# Patient Record
Sex: MALE | Race: WHITE | HISPANIC OR LATINO | Employment: FULL TIME | URBAN - METROPOLITAN AREA
[De-identification: names, ages, dates, MRNs, and addresses within clinical notes are randomized per-mention and may not be internally consistent; named-entity substitution may affect disease eponyms.]

---

## 2017-08-03 ENCOUNTER — ALLSCRIPTS OFFICE VISIT (OUTPATIENT)
Dept: OTHER | Facility: OTHER | Age: 37
End: 2017-08-03

## 2017-08-03 ENCOUNTER — HOSPITAL ENCOUNTER (OUTPATIENT)
Dept: RADIOLOGY | Facility: HOSPITAL | Age: 37
Discharge: HOME | End: 2017-08-03
Payer: COMMERCIAL

## 2017-08-03 DIAGNOSIS — R07.81 PLEURODYNIA: ICD-10-CM

## 2017-08-03 PROCEDURE — 71101 X-RAY EXAM UNILAT RIBS/CHEST: CPT

## 2018-01-09 NOTE — MISCELLANEOUS
Message  Return to work or school:    He is able to return to work on  6/6/2016      Patient was seen on our office on 6/3/2016  Dr Rafa De Jesus DO / Fidel Standard        Signatures   Electronically signed by : Jenaro Valle RN; Cristian  3 2016 12:14PM EST                       (Author)

## 2018-01-13 VITALS
WEIGHT: 222 LBS | DIASTOLIC BLOOD PRESSURE: 70 MMHG | BODY MASS INDEX: 34.84 KG/M2 | SYSTOLIC BLOOD PRESSURE: 142 MMHG | HEIGHT: 67 IN

## 2018-01-14 NOTE — RESULT NOTES
Message   HIV test wnl  Verified Results  (1) HIV-1 RNA QUANTITATIVE [id number] 02:47PM Esteban Leung Order Number: [id number]     Test Name Result Flag Reference   HIV-1 RNA QUANT      <20  The reportable range for this assay is 20 to 10,000,000  copies HIV-1 RNA/mL   copies/mL   HIV-1 RNA VIRAL LOAD LOG      COMMNT  component test  [id number]/mL   Performed at:  [address] [place] [address]  [id number]  : Dino Anton MD, Phone:  [phone]

## 2018-01-15 NOTE — RESULT NOTES
Message   cxray wnl  Verified Results  * XR CHEST PA & LATERAL [id number] 12:28PM Luli Pride Order Number: [id number]     Test Name Result Flag Reference   XR CHEST PA & LATERAL (Report)     CHEST     INDICATION: Cough, chest tightness   COMPARISON: None     VIEWS: Frontal and lateral projections; 3 images     FINDINGS:     The lungs are clear  No pleural effusions  The cardiomediastinal silhouette is unremarkable  Bony thorax unremarkable         IMPRESSION:     No acute cardiopulmonary disease       Workstation performed: [id number]     Signed by:   Ramon Bradley MD   6/3/16

## 2018-01-18 NOTE — RESULT NOTES
Message   HIV test wnl  Verified Results  (1) HIV-1 RNA QUANTITATIVE [id number] 02:47PM Abelardo Cowan Order Number: [id number]     Test Name Result Flag Reference   HIV-1 RNA QUANT      HIV-1 RNA not detected  The reportable range for this assay is 20 to 10,000,000  copies HIV-1 RNA/mL   copies/mL   HIV-1 RNA VIRAL LOAD LOG      Unable to calculate result since non-numeric result obtained for  component test  [id number]/mL   Performed at:  [address] [place] [address]  [id number]  : Tammi Bolaños MD, Phone:  [phone]

## 2018-07-02 ENCOUNTER — OFFICE VISIT (OUTPATIENT)
Dept: FAMILY MEDICINE CLINIC | Facility: CLINIC | Age: 38
End: 2018-07-02
Payer: COMMERCIAL

## 2018-07-02 VITALS
HEIGHT: 67 IN | WEIGHT: 204 LBS | DIASTOLIC BLOOD PRESSURE: 88 MMHG | SYSTOLIC BLOOD PRESSURE: 132 MMHG | BODY MASS INDEX: 32.02 KG/M2

## 2018-07-02 DIAGNOSIS — B02.9 HERPES ZOSTER WITHOUT COMPLICATION: Primary | ICD-10-CM

## 2018-07-02 PROCEDURE — 3008F BODY MASS INDEX DOCD: CPT | Performed by: NURSE PRACTITIONER

## 2018-07-02 PROCEDURE — 99213 OFFICE O/P EST LOW 20 MIN: CPT | Performed by: NURSE PRACTITIONER

## 2018-07-02 RX ORDER — VALACYCLOVIR HYDROCHLORIDE 1 G/1
1000 TABLET, FILM COATED ORAL 3 TIMES DAILY
Qty: 21 TABLET | Refills: 0 | Status: SHIPPED | OUTPATIENT
Start: 2018-07-02 | End: 2020-07-02 | Stop reason: CLARIF

## 2018-07-02 NOTE — PATIENT INSTRUCTIONS
Start antiviral, this is 1000mg every 8 hours (3 times a day) for 7 days  Stay away from any babies or pregnant women  Call if any worsening symptoms or no improvement  You may use tylenol/advil for pain  If pain increases let us know  Shingles   AMBULATORY CARE:   Shingles  is a painful rash  Shingles is caused by the same virus that causes chickenpox (varicella-zoster virus)  After you get chickenpox, the virus stays in your body for several years without causing any symptoms  Shingles occurs when the virus becomes active again  Once active, the virus will travel along a nerve to your skin and cause a rash  Common signs and symptoms include the following:  Shingles often starts with pain in the back, chest, neck, or face  A rash then develops in the same area  The rash is usually found on only one side of the body  The rash may feel itchy or painful  It starts as red dots that become blisters filled with fluid  The blisters usually grow bigger, become filled with pus, and then crust over after a few days  You may also have any of the following:  · Fatigue and muscle weakness    · Pain when your skin is lightly touched    · Headache    · Fever    · Eye pain when exposed to light  Seek care immediately if:   · You have painful, red, warm skin around the blisters, or the blisters drain pus  · Your neck is stiff or you have trouble moving it  · You have trouble moving your arms, legs, or face  · You have a seizure  · You have weakness in an arm or leg  · You become confused, or have difficulty speaking  · You have dizziness, a severe headache, or hearing or vision loss  Contact your healthcare provider if:   · You feel weak or have a headache  · You have a cough, chills, or a fever  · You have abdominal pain or nausea, or you are vomiting  · Your rash becomes more itchy or painful  · Your rash spreads to other parts of your body      · Your pain worsens and does not go away even after you take medicine  · You have questions or concerns about your condition or care  Medicines:   · Antiviral medicine  helps decrease symptoms and healing time  They may also decrease your risk of developing nerve pain  You will need to start taking them within 3 days of the start of symptoms to prevent nerve pain  · Pain medicine  may be prescribed or suggested by your healthcare provider  You may need NSAIDs, acetaminophen, or opioid medicine depending on how much pain you are in  Do not wait until the pain is severe before you take more pain medicine  · Topical anesthetics  are used to numb the skin and decrease pain  They can be a cream, gel, spray, or patch  · Anticonvulsants  decrease nerve pain and may help you sleep at night  · Antidepressants  may be used to decrease nerve pain  Follow up with your healthcare provider as directed:  Write down your questions so you remember to ask them during your visits  Self-care:  Keep your rash clean and dry  Cover your rash with a bandage or clothing  Do not use bandages that stick to your skin  The sticky part may irritate your skin and make your rash last longer  Prevent the spread of shingles: The virus can be passed to a person who has never had chickenpox  This person may get chickenpox, but not shingles  You may pass the virus to others as long as you have a rash  The virus is spread by direct contact with the fluid from the blisters  Usually, you cannot spread the virus once the blisters dry up  Prevent shingles or another shingles outbreak:  A vaccine may be given to help prevent shingles  Ask for more information about this vaccine  © 2017 [employer] Information is for End User's use only and may not be sold, redistributed or otherwise used for commercial purposes  All illustrations and images included in CareNotes® are the copyrighted property of A D A M [employer] Inc  or [employer]    The above information is an  only  It is not intended as medical advice for individual conditions or treatments  Talk to your doctor, nurse or pharmacist before following any medical regimen to see if it is safe and effective for you

## 2018-07-02 NOTE — PROGRESS NOTES
Assessment/Plan:    Herpes zoster without complication  Will start valtrex 1000mg TID for 7 days  Use tylenol or advil PRN for pain  Patient educated to stay way from pregnant women in babies  Handout on shingles given the patient  If pain is not well tolerated with Advil or Tylenol he is to notify us and we can try a Lidoderm cream   Patient is to call us if any worsening symptoms or no improvement  Patient verbalizes understand and agrees with treatment plan  Diagnoses and all orders for this visit:    Herpes zoster without complication  -     valACYclovir (VALTREX) 1,000 mg tablet; Take 1 tablet (1,000 mg total) by mouth 3 (three) times a day for 7 days          Subjective:      Patient ID: Karen Fischer is a 44 y o  male  Chief Complaint   Patient presents with    Rash     rash possible shingles on back on the left side stated its not painful but just uncomfortable       Patient presents to office today for evaluation of a rash that is on his left back  Patient states that appeared about 1-2 weeks ago  He states that part of it was improving but it is spreading around his back to his rib  Patient states that is a little bit tender but there is really not a whole lot of pain there  Patient states he did have chickenpox as a child  Rash   This is a new problem  The current episode started 1 to 4 weeks ago  The problem is unchanged  Location: left back and rib  The rash is characterized by blistering, redness and pain  He was exposed to nothing  Pertinent negatives include no anorexia, congestion, cough, diarrhea, eye pain, facial edema, fatigue, fever, joint pain, rhinorrhea, shortness of breath, sore throat or vomiting  Past treatments include nothing  The treatment provided no relief         The following portions of the patient's history were reviewed and updated as appropriate: allergies, current medications, past family history, past social history and problem list     Review of Systems Constitutional: Negative for fatigue and fever  HENT: Negative for congestion, rhinorrhea and sore throat  Eyes: Negative for pain  Respiratory: Negative for cough and shortness of breath  Gastrointestinal: Negative for abdominal pain, anorexia, diarrhea, nausea and vomiting  Genitourinary: Negative for difficulty urinating and dysuria  Musculoskeletal: Negative for joint pain and myalgias  Skin: Positive for rash  Neurological: Negative for dizziness, light-headedness and headaches  Psychiatric/Behavioral: Negative for agitation  Objective:  /88 (BP Location: Left arm, Patient Position: Sitting, Cuff Size: Large)   Ht 5' 7" (1 702 m)   Wt 92 5 kg (204 lb)   BMI 31 95 kg/m²      Physical Exam   Constitutional: He is oriented to person, place, and time  He appears well-developed  No distress  obese   HENT:   Head: Normocephalic and atraumatic  Right Ear: External ear normal    Left Ear: External ear normal    Nose: Nose normal    Eyes: Conjunctivae and lids are normal  Right eye exhibits no discharge  Left eye exhibits no discharge  Neck: Normal range of motion  Neck supple  No tracheal deviation present  Cardiovascular: Normal rate and regular rhythm  No murmur heard  Pulmonary/Chest: Effort normal and breath sounds normal  No respiratory distress  He has no wheezes  Abdominal: Soft  Bowel sounds are normal  He exhibits no distension  There is no tenderness  There is no guarding  Musculoskeletal: Normal range of motion  He exhibits no edema, tenderness or deformity  Lymphadenopathy:     He has no cervical adenopathy  Neurological: He is alert and oriented to person, place, and time  Coordination normal    Skin: Skin is warm and dry  Rash noted  Rash is vesicular  He is not diaphoretic  No erythema  Vesicular rash consistent with herpes zoster present on left back spreading around left ribs    Psychiatric: He has a normal mood and affect   His speech is normal and behavior is normal  Judgment and thought content normal  Cognition and memory are normal    Nursing note and vitals reviewed

## 2018-07-02 NOTE — ASSESSMENT & PLAN NOTE
Will start valtrex 1000mg TID for 7 days  Use tylenol or advil PRN for pain  Patient educated to stay way from pregnant women in babies  Handout on shingles given the patient  If pain is not well tolerated with Advil or Tylenol he is to notify us and we can try a Lidoderm cream   Patient is to call us if any worsening symptoms or no improvement

## 2019-11-26 DIAGNOSIS — Z80.6 FAMILY HISTORY OF AML (ACUTE MYELOID LEUKEMIA): Primary | ICD-10-CM

## 2020-05-15 ENCOUNTER — TELEMEDICINE (OUTPATIENT)
Dept: FAMILY MEDICINE CLINIC | Facility: CLINIC | Age: 40
End: 2020-05-15
Payer: COMMERCIAL

## 2020-05-15 DIAGNOSIS — M54.2 NECK PAIN: Primary | ICD-10-CM

## 2020-05-15 DIAGNOSIS — M99.01 SOMATIC DYSFUNCTION OF CERVICAL REGION: ICD-10-CM

## 2020-05-15 PROCEDURE — 99213 OFFICE O/P EST LOW 20 MIN: CPT | Performed by: FAMILY MEDICINE

## 2020-05-15 RX ORDER — CYCLOBENZAPRINE HCL 10 MG
10 TABLET ORAL
Qty: 20 TABLET | Refills: 0 | Status: SHIPPED | OUTPATIENT
Start: 2020-05-15 | End: 2020-07-02 | Stop reason: CLARIF

## 2020-06-11 ENCOUNTER — OFFICE VISIT (OUTPATIENT)
Dept: FAMILY MEDICINE CLINIC | Facility: CLINIC | Age: 40
End: 2020-06-11
Payer: COMMERCIAL

## 2020-06-11 VITALS
HEART RATE: 98 BPM | DIASTOLIC BLOOD PRESSURE: 84 MMHG | TEMPERATURE: 97.5 F | OXYGEN SATURATION: 98 % | WEIGHT: 212.4 LBS | SYSTOLIC BLOOD PRESSURE: 128 MMHG | BODY MASS INDEX: 33.34 KG/M2 | HEIGHT: 67 IN

## 2020-06-11 DIAGNOSIS — R68.89 WORSENING FUNCTIONAL ENDURANCE: ICD-10-CM

## 2020-06-11 DIAGNOSIS — F43.9 STRESS: ICD-10-CM

## 2020-06-11 DIAGNOSIS — R42 DIZZINESS: Primary | ICD-10-CM

## 2020-06-11 DIAGNOSIS — R03.0 ELEVATED BLOOD-PRESSURE READING WITHOUT DIAGNOSIS OF HYPERTENSION: ICD-10-CM

## 2020-06-11 PROCEDURE — 3008F BODY MASS INDEX DOCD: CPT | Performed by: FAMILY MEDICINE

## 2020-06-11 PROCEDURE — 99214 OFFICE O/P EST MOD 30 MIN: CPT | Performed by: FAMILY MEDICINE

## 2020-06-11 RX ORDER — ALPRAZOLAM 0.25 MG/1
TABLET ORAL
Qty: 10 TABLET | Refills: 0 | Status: SHIPPED | OUTPATIENT
Start: 2020-06-11

## 2020-07-02 ENCOUNTER — OFFICE VISIT (OUTPATIENT)
Dept: FAMILY MEDICINE CLINIC | Facility: CLINIC | Age: 40
End: 2020-07-02
Payer: COMMERCIAL

## 2020-07-02 VITALS
DIASTOLIC BLOOD PRESSURE: 90 MMHG | OXYGEN SATURATION: 97 % | HEIGHT: 68 IN | BODY MASS INDEX: 32.16 KG/M2 | WEIGHT: 212.2 LBS | SYSTOLIC BLOOD PRESSURE: 130 MMHG | TEMPERATURE: 97.5 F | HEART RATE: 76 BPM

## 2020-07-02 DIAGNOSIS — F41.9 ANXIETY: Primary | ICD-10-CM

## 2020-07-02 PROCEDURE — 3008F BODY MASS INDEX DOCD: CPT | Performed by: NURSE PRACTITIONER

## 2020-07-02 PROCEDURE — 99213 OFFICE O/P EST LOW 20 MIN: CPT | Performed by: FAMILY MEDICINE

## 2020-07-02 PROCEDURE — 1036F TOBACCO NON-USER: CPT | Performed by: FAMILY MEDICINE

## 2020-07-05 NOTE — PROGRESS NOTES
Assessment/Plan:    Labs reviewed with patient  All are normal   Try to maintain healthy diet and watch weight as he is obese  Symptoms he was having seemed quite resolved will by taking Xanax  He would like to stay with this regimen for now and only use it as a p r n  If needs refill will have him sign controlled substance agreement  Otherwise consider flu shot in the fall  Diagnoses and all orders for this visit:    Anxiety        1  Anxiety         Subjective:        Patient ID: Yvette Fontanez is a 35 y o  male  Chief Complaint   Patient presents with    Follow-up     3 weeks; Patient here for review of labs  States Xanax helped his symptoms discussed on previous visit  Has only used once or twice  The following portions of the patient's history were reviewed and updated as appropriate: past medical history, past surgical history and problem list       Review of Systems   Constitutional: Negative for fatigue  Eyes: Negative for visual disturbance  Respiratory: Negative for cough and shortness of breath  Cardiovascular: Negative for chest pain, palpitations and leg swelling  Gastrointestinal: Negative for abdominal pain  Neurological: Negative for dizziness and headaches  Psychiatric/Behavioral: The patient is nervous/anxious  Objective:  /90 (BP Location: Left arm, Patient Position: Sitting, Cuff Size: Standard)   Pulse 76   Temp 97 5 °F (36 4 °C)   Ht 5' 7 5" (1 715 m)   Wt 96 3 kg (212 lb 3 2 oz)   SpO2 97%   BMI 32 74 kg/m²        Physical Exam   Constitutional: He is oriented to person, place, and time  He appears well-developed and well-nourished  No distress  Obese   HENT:   Head: Normocephalic  Right Ear: Tympanic membrane normal    Left Ear: Tympanic membrane normal    Nose: Nose normal    Eyes: Pupils are equal, round, and reactive to light  No scleral icterus  Cardiovascular: Normal heart sounds  No murmur heard    Pulmonary/Chest: Breath sounds normal    Musculoskeletal: He exhibits no edema  Lymphadenopathy:     He has no cervical adenopathy  Neurological: He is alert and oriented to person, place, and time  Psychiatric: He has a normal mood and affect  His behavior is normal  Thought content normal    Nursing note and vitals reviewed

## 2020-07-17 ENCOUNTER — NURSE TRIAGE (OUTPATIENT)
Dept: OTHER | Facility: OTHER | Age: 40
End: 2020-07-17

## 2020-07-18 NOTE — TELEPHONE ENCOUNTER
Pts wife reported that pt thinks he is having a sinus infection beginning, but also unsure if he may have covid  Fever x 2 days, at it's highest 102 1 (oral) today  Temp now 98 5 (oral)  Attempted multiple times to schedule a virtual visit with pts PCP but was unable to do so  Pt's wife was advised to call back or go to [facility] over the weekend if his symptoms are worsening  Pts wife was also agreeable to contacting PCP office on Monday morning to schedule an appt if he is not seen over the weekend for worsening symptoms

## 2020-07-18 NOTE — TELEPHONE ENCOUNTER
Regarding: Coronavirus   ----- Message from Debbie Olivera sent at 7/17/2020  8:58 PM EDT -----  Wife calling back, still awaiting a call, gave Motrin and fever still holding at 102  ----- Message from Hortencia Schmid sent at 7/17/2020  7:01 PM EDT -----  Patient's wife called stating the patient is having a fever of 102 6, body aches, and headaches   Please call patient back

## 2020-07-18 NOTE — TELEPHONE ENCOUNTER
Reason for Disposition   COVID-19, questions about    Answer Assessment - Initial Assessment Questions  1  COVID-19 DIAGNOSIS: "Who made your Coronavirus (COVID-19) diagnosis?" "Was it confirmed by a positive lab test?" If not diagnosed by a HCP, ask "Are there lots of cases (community spread) where you live?" (See public health department website, if unsure)    * MAJOR community spread: high number of cases; numbers of cases are increasing; many people hospitalized  * MINOR community spread: low number of cases; not increasing; few or no people hospitalized      Not diagnosed, not around anyone who was covid positive, moderate community spread  2  ONSET: "When did the COVID-19 symptoms start?"       yesterday  3  WORST SYMPTOM: "What is your worst symptom?" (e g , cough, fever, shortness of breath, muscle aches)      Body aches  4  COUGH: "Do you have a cough?" If so, ask: "How bad is the cough?"        denies  5  FEVER: "Do you have a fever?" If so, ask: "What is your temperature, how was it measured, and when did it start?"      Started yesterday, highest of 102 1 today (taken orally)  6  RESPIRATORY STATUS: "Describe your breathing?" (e g , shortness of breath, wheezing, unable to speak)       normal  7  BETTER-SAME-WORSE: "Are you getting better, staying the same or getting worse compared to yesterday?"  If getting worse, ask, "In what way?"      Slightly worse today  8  HIGH RISK DISEASE: "Do you have any chronic medical problems?" (e g , asthma, heart or lung disease, weak immune system, etc )      denies  9   OTHER SYMPTOMS: "Do you have any other symptoms?"  (e g , runny nose, headache, sore throat, loss of smell)       Slight congestion and sinus pressure, had headache earlier    Protocols used: CORONAVIRUS (COVID-19) - DIAGNOSED OR SUSPECTED-ADULT-

## 2020-07-20 ENCOUNTER — TELEMEDICINE (OUTPATIENT)
Dept: FAMILY MEDICINE CLINIC | Facility: CLINIC | Age: 40
End: 2020-07-20
Payer: COMMERCIAL

## 2020-07-20 VITALS — TEMPERATURE: 97.7 F

## 2020-07-20 DIAGNOSIS — Z20.828 EXPOSURE TO SARS-ASSOCIATED CORONAVIRUS: ICD-10-CM

## 2020-07-20 DIAGNOSIS — Z20.828 EXPOSURE TO SARS-ASSOCIATED CORONAVIRUS: Primary | ICD-10-CM

## 2020-07-20 PROCEDURE — 99214 OFFICE O/P EST MOD 30 MIN: CPT | Performed by: NURSE PRACTITIONER

## 2020-07-20 PROCEDURE — U0003 INFECTIOUS AGENT DETECTION BY NUCLEIC ACID (DNA OR RNA); SEVERE ACUTE RESPIRATORY SYNDROME CORONAVIRUS 2 (SARS-COV-2) (CORONAVIRUS DISEASE [COVID-19]), AMPLIFIED PROBE TECHNIQUE, MAKING USE OF HIGH THROUGHPUT TECHNOLOGIES AS DESCRIBED BY CMS-2020-01-R: HCPCS

## 2020-07-20 NOTE — PROGRESS NOTES
COVID-19 Virtual Visit     Assessment/Plan:    Problem List Items Addressed This Visit     None      Visit Diagnoses     Exposure to SARS-associated coronavirus    -  Primary    Relevant Orders    MISC COVID-19 TEST- Collected at [facility] or [facility]        This virtual check-in was done via [employer] and patient was informed that this is a secure, HIPAA-compliant platform  He agrees to proceed     Disposition:      I referred Jaquan Chavez to one of our centralized sites for a COVID-19 swab  Patient advised to remain in isolation until results back  Fluids/ hydration/ tylenol/ advil/ heat for neck pain, rest  Advised to call for any changes in symptoms, any worsening such as any respiratory symptoms changes in neck tension or fever not going down with medication  Please call the office if you are experiencing any worsening of symptoms or no symptom improvement  I spent 15 minutes with patient today in which greater than 50% of the time was spent in counseling/coordination of care regarding covid screening    Encounter provider Darlene Delgado, [facility]    Provider located at [address] [place] [phone]    Recent Visits  No visits were found meeting these conditions  Showing recent visits within past 7 days and meeting all other requirements     Today's Visits  Date Type Provider Dept   07/20/20 Telemedicine KIMBERLY Flood [facility] today's visits and meeting all other requirements     Future Appointments  No visits were found meeting these conditions  Showing future appointments within next 150 days and meeting all other requirements        Patient agrees to participate in a virtual check in via telephone or video visit instead of presenting to the office to address urgent/immediate medical needs  Patient is aware this is a billable service         After connecting through [employer], the patient was identified by name and date of birth  Stevo Hong was informed that this was a telemedicine visit and that the exam was being conducted confidentially over secure lines  My office door was closed  No one else was in the room  Stevo Hong acknowledged consent and understanding of privacy and security of the telemedicine visit  I informed the patient that I have reviewed his record in Epic and presented the opportunity for him to ask any questions regarding the visit today  The patient agreed to participate  Subjective  Stevo Hong is a 38 y o  male who is concerned about COVID-19  He reports fever, chills, headache, fatigue and myalgias  He has not experienced cough, shortness of breath, sore throat, anorexia, anosmia, abdominal pain, diarrhea, nausea and vomiting He has not had contact with a person who is under investigation for or who is positive for COVID-19 within the last 14 days  He has not been hospitalized recently for fever and/or lower respiratory symptoms  Usually gets post nasal drip, thursday got worse with a fever  Friday the fever got higher at 102  He has pressure in his neck (left side) and eyes as well  Throughout the day he feels better but towards the evening the fever starts to spike again, it does break with Tylenol  He feels his nose is very try too  Had fever last night  Has had headaches as well  Temperature last night was 100 0  Temperature oral this morning 0900 97 7  No past medical history on file  No past surgical history on file  Current Outpatient Medications   Medication Sig Dispense Refill    ALPRAZolam (XANAX) 0 25 mg tablet Take half to 1 tablet daily as needed for severe stress 10 tablet 0     No current facility-administered medications for this visit  Allergies   Allergen Reactions    Amoxicillin     Penicillins        Review of Systems   Constitutional: Positive for chills, fatigue and fever  HENT: Negative for sore throat      Eyes: Negative for discharge  Respiratory: Negative for cough, chest tightness and shortness of breath  Cardiovascular: Negative for chest pain  Gastrointestinal: Negative for constipation and diarrhea  Genitourinary: Negative for difficulty urinating  Musculoskeletal: Positive for neck pain (tightness/ pulled muscle left side)  Negative for joint swelling  Skin: Negative for rash  Neurological: Positive for headaches  Hematological: Negative for adenopathy  Psychiatric/Behavioral: The patient is not nervous/anxious  Video Exam    Vitals:    07/20/20 0927   Temp: 97 7 °F (36 5 °C)   TempSrc: Oral         Maciel appears healthy, alert, no distress  Physical Exam   Constitutional: He is oriented to person, place, and time  He appears well-developed  No distress  obese   HENT:   Head: Normocephalic and atraumatic  Eyes: No scleral icterus  Neck: Normal range of motion  Pulmonary/Chest: Effort normal  No respiratory distress  Neurological: He is alert and oriented to person, place, and time  Skin: He is not diaphoretic  No pallor  VIRTUAL VISIT DISCLAIMER    Leela Dixon acknowledges that he has consented to an online visit or consultation  He understands that the online visit is based solely on information provided by him, and that, in the absence of a face-to-face physical evaluation by the physician, the diagnosis he receives is both limited and provisional in terms of accuracy and completeness  This is not intended to replace a full medical face-to-face evaluation by the physician  Leela Dixon understands and accepts these terms

## 2020-07-23 ENCOUNTER — TELEMEDICINE (OUTPATIENT)
Dept: FAMILY MEDICINE CLINIC | Facility: CLINIC | Age: 40
End: 2020-07-23
Payer: COMMERCIAL

## 2020-07-23 DIAGNOSIS — J01.90 ACUTE NON-RECURRENT SINUSITIS, UNSPECIFIED LOCATION: Primary | ICD-10-CM

## 2020-07-23 LAB — SARS-COV-2 RNA SPEC QL NAA+PROBE: NOT DETECTED

## 2020-07-23 PROCEDURE — 99213 OFFICE O/P EST LOW 20 MIN: CPT | Performed by: NURSE PRACTITIONER

## 2020-07-23 PROCEDURE — 1036F TOBACCO NON-USER: CPT | Performed by: NURSE PRACTITIONER

## 2020-07-23 RX ORDER — PREDNISONE 20 MG/1
40 TABLET ORAL DAILY
Qty: 6 TABLET | Refills: 0 | Status: SHIPPED | OUTPATIENT
Start: 2020-07-23 | End: 2020-07-26

## 2020-07-23 RX ORDER — FLUTICASONE PROPIONATE 50 MCG
1 SPRAY, SUSPENSION (ML) NASAL DAILY
Qty: 1 BOTTLE | Refills: 0 | Status: SHIPPED | OUTPATIENT
Start: 2020-07-23

## 2020-07-23 RX ORDER — DOXYCYCLINE 100 MG/1
100 TABLET ORAL 2 TIMES DAILY
Qty: 14 TABLET | Refills: 0 | Status: SHIPPED | OUTPATIENT
Start: 2020-07-23 | End: 2020-07-30

## 2020-07-23 NOTE — PROGRESS NOTES
Virtual Regular Visit      Assessment/Plan:    Problem List Items Addressed This Visit     None      Visit Diagnoses     Acute non-recurrent sinusitis, unspecified location    -  Primary    Relevant Medications    doxycycline (ADOXA) 100 MG tablet    predniSONE 20 mg tablet    fluticasone (FLONASE) 50 mcg/act nasal spray        Sinusitis  Covid negative  Symptoms consistent with sinus infection  Start antibiotic, this is doxycycline 100 mg twice daily for 7 days  Take antibiotic with food  If you take any vitamin supplements, try to separate dose by 4-6 hours as this may impair absorption of the antibiotic  Start prednisone, this is the steroid  40mg daily for 3 days  Take with food  It's better to take it earlier in the day than later as it could keep you up at night  Do not mix with NSAIDs such as aleve, ibuprofen, advil, motrin  Start Flonase, this is an intranasal corticosteroid  This is 1-2 sprays each nostril once daily  Please be aware that this can cause nasal mucosa irritation  Stop using if you experience any nose bleeds  This can be used for allergy symptoms as well as ear discomfort/pressure  Will call Monday with update and will then decide on plan for return to work  Please call the office if you are experiencing any worsening of symptoms or no symptom improvement              Reason for visit is   Chief Complaint   Patient presents with    Virtual Regular Visit        [facility]    Provider located at [address] [place] [id number]      Recent Visits  Date Type Provider Dept   07/20/20 [facility] 20, [facility] Total [facility] recent visits within past 7 days and meeting all other requirements     Today's Visits  Date Type Provider Dept   07/23/20 Telemedicine KIMBERLY Otto  Total [facility] today's visits and meeting all other requirements Future Appointments  No visits were found meeting these conditions  Showing future appointments within next 150 days and meeting all other requirements        The patient was identified by name and date of birth  Enma Madden was informed that this is a telemedicine visit and that the visit is being conducted through [facility] and patient was informed that this is a secure, HIPAA-compliant platform  He agrees to proceed     My office door was closed  No one else was in the room  He acknowledged consent and understanding of privacy and security of the video platform  The patient has agreed to participate and understands they can discontinue the visit at any time  Patient is aware this is a billable service  Subjective  Enma Madden is a 38 y o  male   Visit today for continued symptoms  Has been having sinus pressure/pain and sinus headaches, also pressure in his ears  Post nasal drip as well  Last time he had a fever was yesterday at 5:30pm 100 6  No fever today  COVID testing negative  No past medical history on file  No past surgical history on file  Current Outpatient Medications   Medication Sig Dispense Refill    ALPRAZolam (XANAX) 0 25 mg tablet Take half to 1 tablet daily as needed for severe stress 10 tablet 0    doxycycline (ADOXA) 100 MG tablet Take 1 tablet (100 mg total) by mouth 2 (two) times a day for 7 days 14 tablet 0    fluticasone (FLONASE) 50 mcg/act nasal spray 1 spray into each nostril daily 1 Bottle 0    predniSONE 20 mg tablet Take 2 tablets (40 mg total) by mouth daily for 3 days 6 tablet 0     No current facility-administered medications for this visit  Allergies   Allergen Reactions    Amoxicillin     Penicillins        Review of Systems   Constitutional: Positive for fever  Negative for chills  HENT: Positive for congestion, postnasal drip, sinus pressure and sinus pain  Negative for sore throat  Eyes: Negative for discharge  Respiratory: Negative for choking and shortness of breath  Cardiovascular: Negative for chest pain  Gastrointestinal: Negative for abdominal pain, constipation, diarrhea, nausea and vomiting  Genitourinary: Negative for difficulty urinating  Musculoskeletal: Negative for joint swelling  Skin: Negative for rash  Neurological: Negative for headaches  Hematological: Negative for adenopathy  Psychiatric/Behavioral: The patient is not nervous/anxious  Video Exam    There were no vitals filed for this visit  Physical Exam   Constitutional: He is oriented to person, place, and time  He appears well-developed  No distress  HENT:   Head: Normocephalic and atraumatic  Neck: Normal range of motion  Pulmonary/Chest: Effort normal  No respiratory distress  Neurological: He is alert and oriented to person, place, and time  Skin: He is not diaphoretic  No pallor  I spent 15 minutes with patient today in which greater than 50% of the time was spent in counseling/coordination of care regarding sinus infection      VIRTUAL VISIT DISCLAIMER    Raven Ledbetter acknowledges that he has consented to an online visit or consultation  He understands that the online visit is based solely on information provided by him, and that, in the absence of a face-to-face physical evaluation by the physician, the diagnosis he receives is both limited and provisional in terms of accuracy and completeness  This is not intended to replace a full medical face-to-face evaluation by the physician  Raven Ledbetter understands and accepts these terms

## 2021-03-31 DIAGNOSIS — Z23 ENCOUNTER FOR IMMUNIZATION: ICD-10-CM

## 2021-04-09 ENCOUNTER — TELEPHONE (OUTPATIENT)
Dept: FAMILY MEDICINE CLINIC | Facility: CLINIC | Age: 41
End: 2021-04-09

## 2021-08-09 ENCOUNTER — CLINICAL SUPPORT (OUTPATIENT)
Dept: FAMILY MEDICINE CLINIC | Facility: CLINIC | Age: 41
End: 2021-08-09

## 2021-08-09 DIAGNOSIS — Z78.9 PATIENT TRAVELS: Primary | ICD-10-CM

## 2021-08-10 LAB — SARS-COV-2 RNA RESP QL NAA+PROBE: NEGATIVE

## 2022-01-09 NOTE — RESULT NOTES
Message   HSV-1 cold sores positive other wise labs wnl so far  Still awaiting HIV test      Verified Results  (1) CHLAMYDIA/GC AMPLIFIED DNA, PCR [id number] 02:55PM Franco Ingram Order Number: [id number]     Test Name Result Flag Reference   CHLAMYDIA,AMPLIFIED DNA PROBE   C  trachomatis Amplified DNA Negative   C  trachomatis Amplified DNA Negative   For optimal microbe detection, urine samples should be a first catch specimen (20-60 ml of urine)  Patient should not have urinated for at least 1 hour prior to collection  A specimen not collected in this manner may have falsely negative results  Jen Boom AMPLIFIED DNA   N  gonorrhoeae Amplified DNA Negative   N  gonorrhoeae Amplified DNA Negative     (1) COMPREHENSIVE METABOLIC PANEL [id number] 28:71MG Franco Ingram Order Number: [id number]   Order Number: [id number]     Test Name Result Flag Reference   GLUCOSE,RANDM 99 mg/dL     If the patient is fasting, the ADA then defines impaired fasting glucose as > 100 mg/dL and diabetes as > or equal to 123 mg/dL  SODIUM 142 mmol/L  136-145   POTASSIUM 3 6 mmol/L  3 5-5 3   CHLORIDE 103 mmol/L  100-108   CARBON DIOXIDE 32 mmol/L  21-32   ANION GAP (CALC) 7 mmol/L  4-13   BLOOD UREA NITROGEN 9 mg/dL  5-25   CREATININE 1 20 mg/dL  0 60-1 30   Standardized to IDMS reference method   CALCIUM 9 5 mg/dL  8 3-10 1   BILI, TOTAL 0 53 mg/dL  0 20-1 00   ALK PHOSPHATAS 66 U/L     ALT (SGPT) 40 U/L  12-78   AST(SGOT) 14 U/L  5-45   ALBUMIN 4 2 g/dL  3 5-5 0   TOTAL PROTEIN 7 6 g/dL  6 4-8 2   eGFR Non-African American      >60 0 ml/min/1 73[address] [facility] [employer] Disease Education Program recommendations are as follows:  GFR calculation is accurate only with a steady state creatinine  Chronic Kidney disease less than 60 ml/min/1 73 sq  meters  Kidney failure less than 15 ml/min/1 73 sq  meters       (1) CBC/PLT/DIFF [id number] 02:47PM Bridget Ruano   TW Order Number: [id number]   Order Number: [id number]     Test Name Result Flag Reference   WBC COUNT 10 82 Thousand/uL H 4 31-10 16   RBC COUNT 5 50 Million/uL  3 88-5 62   HEMOGLOBIN 16 6 g/dL  12 0-17 0   HEMATOCRIT 48 9 %  36 5-49 3   MCV 89 fL  82-98   MCH 30 2 pg  26 8-34 3   MCHC 33 9 g/dL  31 4-37 4   RDW 12 9 %  11 6-15 1   MPV 10 8 fL  8 9-12 7   PLATELET COUNT 445 Thousands/uL  149-390   nRBC AUTOMATED 0 /100 WBCs     NEUTROPHILS RELATIVE PERCENT 65 %  43-75   LYMPHOCYTES RELATIVE PERCENT 25 %  14-44   MONOCYTES RELATIVE PERCENT 9 %  4-12   EOSINOPHILS RELATIVE PERCENT 1 %  0-6   BASOPHILS RELATIVE PERCENT 0 %  0-1   NEUTROPHILS ABSOLUTE COUNT 6 99 Thousands/?L  1 85-7 62   LYMPHOCYTES ABSOLUTE COUNT 2 72 Thousands/?L  0 60-4 47   MONOCYTES ABSOLUTE COUNT 0 95 Thousand/?L  0 17-1 22   EOSINOPHILS ABSOLUTE COUNT 0 12 Thousand/?L  0 00-0 61   BASOPHILS ABSOLUTE COUNT 0 04 Thousands/?L  0 00-0 10     (1) HERPES I/II ANTIBODIES, IGG [id number] 02:47PM Krysten Kincaid    Order Number: [id number]     Test Name Result Flag Reference   HSV I ERIS IGG 10 60 index H 0 00 - 0 90   Negative        <0 91                                 Equivocal 0 91 - 1 09                                 Positive        >1 09 Note: Negative indicates no antibodies detected to HSV-1  Equivocal may suggest early infection  If clinically appropriate, retest at later date  Positive indicates antibodies detected to HSV-1    HSV II ERIS IGG <0 91 index  0 00 - 0 90   Negative        <0 91                                 Equivocal 0 91 - 1 09                                 Positive        >1 09 Note: Negative indicates no antibodies detected to HSV-2  Equivocal may suggest early infection  If clinically appropriate, retest at later date  Positive indicates antibodies detected to HSV-2      Performed at:  [address] [place] [address]  [id number]  : Navin Grey MD, Phone: [phone]     (1) ACUTE HEPATITIS PANEL [id number] 02:47PM Emiliano Forrester Order Number: [id number]  TW Order Number: [id number]     Test Name Result Flag Reference   HEPATITIS B SURFACE ANTIGEN Non-reactive  Non-reactive, NonReactive - Confirmed   HEPATITIS A IGM ANTIBODY Non-reactive  Non-reactive, Equivocal-Suggest Recollect   HEPATITIS C ANTIBODY Non-reactive  Non-reactive   HEPATITIS B CORE IGM ANTIBODY Non-reactive  Non-reactive     (1) RPR [id number] 02:47PM Karen Upton    Order Number: [id number]     Test Name Result Flag Reference   RPR Non-Reactive  Non-Reactive 1665

## 2022-04-12 ENCOUNTER — OFFICE VISIT (OUTPATIENT)
Dept: FAMILY MEDICINE CLINIC | Facility: CLINIC | Age: 42
End: 2022-04-12
Payer: COMMERCIAL

## 2022-04-12 VITALS
RESPIRATION RATE: 16 BRPM | BODY MASS INDEX: 34.69 KG/M2 | SYSTOLIC BLOOD PRESSURE: 128 MMHG | HEART RATE: 84 BPM | OXYGEN SATURATION: 97 % | WEIGHT: 221 LBS | TEMPERATURE: 96.8 F | HEIGHT: 67 IN | DIASTOLIC BLOOD PRESSURE: 76 MMHG

## 2022-04-12 DIAGNOSIS — F41.9 ANXIETY: ICD-10-CM

## 2022-04-12 DIAGNOSIS — Z13.220 SCREENING FOR HYPERLIPIDEMIA: ICD-10-CM

## 2022-04-12 DIAGNOSIS — J30.2 SEASONAL ALLERGIES: ICD-10-CM

## 2022-04-12 DIAGNOSIS — E66.9 OBESITY (BMI 30-39.9): ICD-10-CM

## 2022-04-12 DIAGNOSIS — Z00.00 HEALTH CARE MAINTENANCE: Primary | ICD-10-CM

## 2022-04-12 PROCEDURE — 99396 PREV VISIT EST AGE 40-64: CPT | Performed by: FAMILY MEDICINE

## 2022-04-12 PROCEDURE — 3008F BODY MASS INDEX DOCD: CPT | Performed by: FAMILY MEDICINE

## 2022-04-12 PROCEDURE — 1036F TOBACCO NON-USER: CPT | Performed by: FAMILY MEDICINE

## 2022-04-12 NOTE — PROGRESS NOTES
Assessment/Plan:  Chief Complaint   Patient presents with    Physical Exam     Patient Instructions   Here for general PE and needs to lose weight as directed to get BMI lower than 25 and rec getting updated labs and rec also sunscreen in summer and monitor for ticks  Anxiety and seasonal allergies stable  Recheck yearly with labs  No problem-specific Assessment & Plan notes found for this encounter  Diagnoses and all orders for this visit:    Health care maintenance  -     Comprehensive metabolic panel; Future  -     CBC and differential; Future  -     Lipid Panel with Direct LDL reflex; Future    Seasonal allergies    Anxiety  -     Comprehensive metabolic panel; Future  -     CBC and differential; Future    Obesity (BMI 30-39 9)  -     Comprehensive metabolic panel; Future  -     Lipid Panel with Direct LDL reflex; Future    Screening for hyperlipidemia  -     Comprehensive metabolic panel; Future  -     Lipid Panel with Direct LDL reflex; Future          Subjective:      Patient ID: Georgina Peralta is a 42 y o  male  Physical Exam   and no childrem and 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 and weightlifting and uses heavy bag and is also cutting out fast food  Gets 6-7 hours and wakes up at times at night and does not snore too bad and no apneic episodes and has normal BM and urination  The following portions of the patient's history were reviewed and updated as appropriate: allergies, current medications, past family history, past medical history, past social history, past surgical history and problem list     Review of Systems   Constitutional: Negative  HENT: Negative  Eyes: Negative  Respiratory: Negative  Cardiovascular: Negative  Gastrointestinal: Negative  Endocrine: Negative  Genitourinary: Negative  Musculoskeletal: Negative  Skin: Negative  Allergic/Immunologic: Negative  Neurological: Negative      Hematological: Negative  Psychiatric/Behavioral: Negative  Objective:      /76 (BP Location: Left arm, Patient Position: Sitting, Cuff Size: Large)   Pulse 84   Temp (!) 96 8 °F (36 °C) (Temporal)   Resp 16   Ht 5' 7" (1 702 m)   Wt 100 kg (221 lb)   SpO2 97%   BMI 34 61 kg/m²          Physical Exam  Constitutional:       Appearance: He is well-developed  HENT:      Head: Normocephalic and atraumatic  Right Ear: External ear normal       Left Ear: External ear normal       Nose: Nose normal    Eyes:      Conjunctiva/sclera: Conjunctivae normal       Pupils: Pupils are equal, round, and reactive to light  Cardiovascular:      Rate and Rhythm: Normal rate and regular rhythm  Heart sounds: Normal heart sounds  Pulmonary:      Effort: Pulmonary effort is normal       Breath sounds: Normal breath sounds  Abdominal:      General: Abdomen is flat  Bowel sounds are normal       Palpations: Abdomen is soft  Musculoskeletal:         General: Normal range of motion  Cervical back: Normal range of motion and neck supple  Skin:     General: Skin is warm and dry  Neurological:      Mental Status: He is alert and oriented to person, place, and time  Deep Tendon Reflexes: Reflexes are normal and symmetric     Psychiatric:         Behavior: Behavior normal

## 2022-04-12 NOTE — PATIENT INSTRUCTIONS
Here for general PE and needs to lose weight as directed to get BMI lower than 25 and rec getting updated labs and rec also sunscreen in summer and monitor for ticks  Anxiety and seasonal allergies stable  Recheck yearly with labs

## 2022-04-12 NOTE — PROGRESS NOTES
BMI Counseling: Body mass index is 34 61 kg/m²  The BMI is above normal  Nutrition recommendations include reducing portion sizes, decreasing overall calorie intake, 3-5 servings of fruits/vegetables daily, reducing fast food intake, consuming healthier snacks and decreasing soda and/or juice intake  Exercise recommendations include exercising 3-5 times per week

## 2024-01-25 ENCOUNTER — OFFICE VISIT (OUTPATIENT)
Dept: FAMILY MEDICINE CLINIC | Facility: CLINIC | Age: 44
End: 2024-01-25
Payer: COMMERCIAL

## 2024-01-25 VITALS
HEART RATE: 102 BPM | SYSTOLIC BLOOD PRESSURE: 124 MMHG | WEIGHT: 222.2 LBS | HEIGHT: 67 IN | DIASTOLIC BLOOD PRESSURE: 82 MMHG | BODY MASS INDEX: 34.88 KG/M2 | TEMPERATURE: 98.2 F | OXYGEN SATURATION: 99 %

## 2024-01-25 DIAGNOSIS — K59.00 CONSTIPATION, UNSPECIFIED CONSTIPATION TYPE: Primary | ICD-10-CM

## 2024-01-25 DIAGNOSIS — K21.9 GERD WITHOUT ESOPHAGITIS: ICD-10-CM

## 2024-01-25 DIAGNOSIS — E66.9 OBESITY (BMI 30-39.9): ICD-10-CM

## 2024-01-25 DIAGNOSIS — Z80.0 FAMILY HISTORY OF COLON CANCER: ICD-10-CM

## 2024-01-25 PROCEDURE — 99214 OFFICE O/P EST MOD 30 MIN: CPT | Performed by: FAMILY MEDICINE

## 2024-01-25 NOTE — PROGRESS NOTES
"Chief Complaint   Patient presents with    Constipation      X 4-5 days.  Has tried otc med      Patient Instructions   Here for hx of constipation and gerd and needs to get GI consult for this. Lose weight to get BMI lower than 25. Rec trial of Miralax prn constipation. May need colon screening earlier due to positive family hx of colon cancer in uncle who had colon cancer in his 50's. Denies rectal bleeding.   Assessment/Plan:    No problem-specific Assessment & Plan notes found for this encounter.       Diagnoses and all orders for this visit:    Constipation, unspecified constipation type  -     Ambulatory Referral to Gastroenterology; Future    Family history of colon cancer  -     Ambulatory Referral to Gastroenterology; Future    Obesity (BMI 30-39.9)    GERD without esophagitis  -     Ambulatory Referral to Gastroenterology; Future          Subjective:      Patient ID: Maciel Alford is a 43 y.o. male.    Constipation ( X 4-5 days.  Has tried otc med ) No rectal bleeding or weight loss and is obese. No other complaints such as abdominal pain. Has constipation and eats poorly. Has hx of gerd. Uses Tums prn gerd.     Constipation        The following portions of the patient's history were reviewed and updated as appropriate: allergies, current medications, past family history, past medical history, past social history, past surgical history, and problem list.    Review of Systems   Constitutional: Negative.    HENT: Negative.     Eyes: Negative.    Respiratory: Negative.     Cardiovascular: Negative.    Gastrointestinal:  Positive for constipation.   Endocrine: Negative.    Genitourinary: Negative.    Musculoskeletal: Negative.    Skin: Negative.    Allergic/Immunologic: Negative.    Neurological: Negative.    Hematological: Negative.    Psychiatric/Behavioral: Negative.           Objective:      /82   Pulse 102   Temp 98.2 °F (36.8 °C)   Ht 5' 7\" (1.702 m)   Wt 101 kg (222 lb 3.2 oz)   SpO2 99%   " BMI 34.80 kg/m²          Physical Exam  Constitutional:       Appearance: He is well-developed. He is obese.   HENT:      Head: Normocephalic and atraumatic.      Right Ear: External ear normal.      Left Ear: External ear normal.      Nose: Nose normal.   Eyes:      Conjunctiva/sclera: Conjunctivae normal.      Pupils: Pupils are equal, round, and reactive to light.   Cardiovascular:      Rate and Rhythm: Normal rate and regular rhythm.      Pulses: Normal pulses.      Heart sounds: Normal heart sounds.   Pulmonary:      Effort: Pulmonary effort is normal.      Breath sounds: Normal breath sounds.   Abdominal:      General: Abdomen is flat. Bowel sounds are normal.      Palpations: Abdomen is soft.   Genitourinary:     Penis: Normal.       Testes: Normal.   Musculoskeletal:         General: Normal range of motion.      Cervical back: Normal range of motion and neck supple.   Skin:     General: Skin is warm and dry.      Capillary Refill: Capillary refill takes less than 2 seconds.   Neurological:      General: No focal deficit present.      Mental Status: He is alert and oriented to person, place, and time. Mental status is at baseline.      Deep Tendon Reflexes: Reflexes are normal and symmetric.   Psychiatric:         Mood and Affect: Mood normal.         Behavior: Behavior normal.         Thought Content: Thought content normal.         Judgment: Judgment normal.

## 2024-01-25 NOTE — PATIENT INSTRUCTIONS
Here for hx of constipation and gerd and needs to get GI consult for this. Lose weight to get BMI lower than 25. Rec trial of Miralax prn constipation. May need colon screening earlier due to positive family hx of colon cancer in uncle who had colon cancer in his 50's. Denies rectal bleeding.

## 2024-01-30 ENCOUNTER — CONSULT (OUTPATIENT)
Dept: GASTROENTEROLOGY | Facility: CLINIC | Age: 44
End: 2024-01-30
Payer: COMMERCIAL

## 2024-01-30 ENCOUNTER — TELEPHONE (OUTPATIENT)
Dept: GASTROENTEROLOGY | Facility: CLINIC | Age: 44
End: 2024-01-30

## 2024-01-30 VITALS
TEMPERATURE: 98.4 F | WEIGHT: 215 LBS | SYSTOLIC BLOOD PRESSURE: 144 MMHG | BODY MASS INDEX: 33.74 KG/M2 | DIASTOLIC BLOOD PRESSURE: 88 MMHG | HEIGHT: 67 IN

## 2024-01-30 DIAGNOSIS — K21.9 CHRONIC GERD: Primary | ICD-10-CM

## 2024-01-30 DIAGNOSIS — R10.13 EPIGASTRIC PAIN: ICD-10-CM

## 2024-01-30 DIAGNOSIS — K59.00 ACUTE CONSTIPATION: ICD-10-CM

## 2024-01-30 PROCEDURE — 99244 OFF/OP CNSLTJ NEW/EST MOD 40: CPT | Performed by: INTERNAL MEDICINE

## 2024-01-30 RX ORDER — POLYETHYLENE GLYCOL 3350 17 G/17G
17 POWDER, FOR SOLUTION ORAL DAILY
Qty: 510 G | Refills: 5 | Status: SHIPPED | OUTPATIENT
Start: 2024-01-30 | End: 2024-07-28

## 2024-01-30 RX ORDER — BISACODYL 5 MG/1
20 TABLET, DELAYED RELEASE ORAL ONCE
Qty: 4 TABLET | Refills: 0 | Status: SHIPPED | OUTPATIENT
Start: 2024-01-30 | End: 2024-01-30

## 2024-01-30 RX ORDER — CETIRIZINE HYDROCHLORIDE 10 MG/1
10 TABLET ORAL AS NEEDED
COMMUNITY

## 2024-01-30 NOTE — PROGRESS NOTES
[facility] - Outpatient Consultation  Maciel Alford 43 y.o. male MRN: [id number]  Encounter: [id number]          ASSESSMENT AND PLAN:      1. Chronic GERD  2.  Abdominal pain  3.  Acute constipation  4.  Preventative health care    Patient has had chronic heartburn.  He has multiple risk factors for developing Becerril's esophagus.  Discussed with patient about investigating for Becerril's with endoscopy.  Also he is having abdominal pain.  Differentials include gastritis, H. pylori infection, ulcer disease in the stomach.  Discussed with patient that we could also evaluate this with endoscopy.  He can get the EGD done on the same day as the colonoscopy.  Patient agreeable to get EGD done.  Ordered procedure today.  He has acute change in bowel habits.  Symptoms have partially responded to MiraLAX.  We will investigate with colonoscopy for large polyps and tumors.  He also has a family history of colon cancer in his uncle who passed away from colon cancer.  Patient agreeable to get colonoscopy done.  Discussed risk and benefits of both procedures.  Will order 2-day prep.  Will give educational handout to patient for management of constipation.  He is currently taking MiraLAX once a day which is not controlling his symptoms.  Discussed with him about increasing the dose of MiraLAX to twice a day and can go up to 3 times a day if needed.  The goal is to have 1-2 soft loose bowel movements per day with feeling of complete evacuation.  Continue the dose of MiraLAX consistently that helps him achieve the goal of bowel movements.  Increase hydration 10-12 cups of water a day.    RTC 4 months.    Nate Friedman MD  Gastroenterology  [facility]  Date: January 30, 2024    - EGD; Future  - Colonoscopy; Future  - bisacodyl (DULCOLAX) 5 mg EC tablet; Take 4 tablets (20 mg total) by mouth once for 1 dose Colonoscopy prep  Dispense: 4 tablet; Refill: 0  - polyethylene glycol  (GOLYTELY) 4000 mL solution; Take 4,000 mL by mouth once for 1 dose Colonoscopy prep  Dispense: 4000 mL; Refill: 0  - polyethylene glycol (MIRALAX) 17 g packet; Take 17 g by mouth daily  Dispense: 510 g; Refill: 5    ______________________________________________________________________    HPI: 43-year-old with family history of colon cancer in uncle, constipation, chronic GERD presents for evaluation.    Patient denies any nausea or vomiting.  He reports that he has had heartburn for years intermittently.  Stable symptoms.  Currently not on any medications for heartburn.  He reports epigastric discomfort intermittently which starts and stopped spontaneously.  Patient reports that he has had constipation/hard to pass stools for the last 1 to 2 weeks.  He started taking MiraLAX which has helped moved his bowels but he continues to have feeling of incomplete evacuation after bowel movements.  No blood in stools.  His uncle  of colon cancer.  Non-smoker.  Occasional alcohol intake.    His last labs were done in  which showed normal TSH, blood counts, renal function and liver enzymes based on my review of the labs today.      REVIEW OF SYSTEMS:    CONSTITUTIONAL: Denies any fever, chills, rigors, and weight loss.  HEENT: No earache or tinnitus. Denies hearing loss or visual disturbances.  CARDIOVASCULAR: No chest pain or palpitations.   RESPIRATORY: Denies any cough, hemoptysis, shortness of breath or dyspnea on exertion.  GASTROINTESTINAL: As noted in the History of Present Illness.   GENITOURINARY: No problems with urination. Denies any hematuria or dysuria.  NEUROLOGIC: No dizziness or vertigo, denies headaches.   MUSCULOSKELETAL: Denies any muscle or joint pain.   SKIN: Denies skin rashes or itching.   ENDOCRINE: Denies excessive thirst. Denies intolerance to heat or cold.  PSYCHOSOCIAL: Denies depression or anxiety. Denies any recent memory loss.       Historical Information   History reviewed. No pertinent  "past medical history.  No past surgical history on file.  Social History   Social History     Substance and Sexual Activity   Alcohol Use Yes    Comment: occaisionally     Social History     Substance and Sexual Activity   Drug Use Never     Social History     Tobacco Use   Smoking Status Never   Smokeless Tobacco Never     Family History   Problem Relation Age of Onset    Colon cancer Paternal Uncle                Meds/Allergies       Current Outpatient Medications:     ALPRAZolam (XANAX) 0.25 mg tablet    bisacodyl (DULCOLAX) 5 mg EC tablet    cetirizine (ZyrTEC) 10 mg tablet    fluticasone (FLONASE) 50 mcg/act nasal spray    polyethylene glycol (GOLYTELY) 4000 mL solution    polyethylene glycol (MIRALAX) 17 g packet    Allergies   Allergen Reactions    Amoxicillin     Penicillins            Objective     Blood pressure 144/88, temperature 98.4 °F (36.9 °C), height 5' 7\" (1.702 m), weight 97.5 kg (215 lb). Body mass index is 33.67 kg/m².        PHYSICAL EXAM:      General Appearance:   Alert, cooperative, no distress   HEENT:   Normocephalic, atraumatic, anicteric.     Neck:  Supple, symmetrical, trachea midline   Lungs:   Clear to auscultation bilaterally; no rales, rhonchi or wheezing; respirations unlabored    Heart::   Regular rate and rhythm; no murmur, rub, or gallop.   Abdomen:   Soft, non-tender, non-distended; normal bowel sounds; no masses, no organomegaly    Genitalia:   Deferred    Rectal:   Deferred    Extremities:  No cyanosis, clubbing or edema    Pulses:  2+ and symmetric    Skin:  No jaundice, rashes, or lesions    Lymph nodes:  No palpable cervical lymphadenopathy        Lab Results:   No visits with results within 1 Day(s) from this visit.   Latest known visit with results is:   Clinical Support on 2021   Component Date Value    SARS-CoV-2 2021 Negative          Radiology Results:   No results found.   Answers submitted by the patient for this visit:  Abdominal Pain " Questionnaire (Submitted on 1/27/2024)  Chief Complaint: Abdominal pain  Chronicity: new  Pain - numeric: 0/10  Pain quality: a sensation of fullness  Radiates to: does not radiate, right flank, pelvis, perineum  anorexia: No  arthralgias: No  belching: Yes  constipation: Yes  diarrhea: No  dysuria: No  fever: No  flatus: No  frequency: No  headaches: No  hematochezia: No  hematuria: No  melena: No  myalgias: No  nausea: No  weight loss: No  vomiting: No

## 2024-01-30 NOTE — PATIENT INSTRUCTIONS
Scheduled date of EGD/colonoscopy (as of today):2/22/24  Physician performing EGD/colonoscopy: Dr. Merino  Location of EGD/colonoscopy: [facility]   Desired bowel prep reviewed with patient: Giuseppe 2 day prep   Instructions reviewed with patient by: Caesar (In office)   Clearances:  N/A        Constipation   WHAT YOU NEED TO KNOW:   Constipation means you have hard, dry bowel movements, or you go longer than usual between bowel movements.  DISCHARGE INSTRUCTIONS:   Call your doctor if:   You have blood in your bowel movements.    You have a fever and abdominal pain with the constipation.    Your constipation gets worse.    You start to vomit.    You have questions or concerns about your condition or care.    Medicines:   Medicine  such as a laxative may help relax and loosen your intestines to help you have a bowel movement. Your provider may recommend you only use laxatives for a short time. Long-term use can damage your bowel function over time.    Take your medicine as directed.  Contact your healthcare provider if you think your medicine is not helping or if you have side effects. Tell your provider if you are allergic to any medicine. Keep a list of the medicines, vitamins, and herbs you take. Include the amounts, and when and why you take them. Bring the list or the pill bottles to follow-up visits. Carry your medicine list with you in case of an emergency.    Relieve constipation:   A suppository  may be used to help soften your bowel movements. This may make them easier to pass. A suppository is guided into your rectum through your anus.         An enema  is liquid medicine used to clear bowel movement from your rectum. The medicine is put into your rectum through your anus.       Prevent constipation:   Drink liquids as directed.  You may need to drink extra liquids to help soften and move your bowels. Ask how much liquid to drink each day and which liquids are best for you.    Eat high-fiber  foods.  This may help decrease constipation by adding bulk to your bowel movements. High-fiber foods include fruit, vegetables, whole-grain breads and cereals, and beans. Your healthcare provider or dietitian can help you create a high-fiber meal plan. Your provider may also recommend a fiber supplement if you cannot get enough fiber from food.         Exercise regularly.  Regular physical activity can help stimulate your intestines. Walking is a good exercise to prevent or relieve constipation. Ask which exercises are best for you.         Schedule a time each day to have a bowel movement.  This may help train your body to have regular bowel movements. Bend forward while you are on the toilet to help move the bowel movement out. Sit on the toilet for at least 10 minutes, even if you do not have a bowel movement.    Talk to your healthcare provider about your medicines.  Certain medicines, such as opioids, can cause constipation. Your provider may be able to make medicine changes. For example, he or she may change the kind of medicine, or change when you take it.    Follow up with your doctor as directed:  Write down your questions so you remember to ask them during your visits.  © Copyright [employer] 2023 Information is for End User's use only and may not be sold, redistributed or otherwise used for commercial purposes.  The above information is an  only. It is not intended as medical advice for individual conditions or treatments. Talk to your doctor, nurse or pharmacist before following any medical regimen to see if it is safe and effective for you.

## 2024-02-09 ENCOUNTER — PATIENT MESSAGE (OUTPATIENT)
Dept: GASTROENTEROLOGY | Facility: CLINIC | Age: 44
End: 2024-02-09

## 2024-02-16 ENCOUNTER — TELEPHONE (OUTPATIENT)
Dept: GASTROENTEROLOGY | Facility: CLINIC | Age: 44
End: 2024-02-16

## 2024-02-16 NOTE — TELEPHONE ENCOUNTER
Pt is pt of Dr. Friedman and was scheduled w/ Dr. Merino at  due to availability at time of scheduling 01/30/2024.  As of 02/16/2024 Dr. Merino was overbooked and Dr. Friedman had a cancellation on 02/21/2024 at .  Pt lives in [place] and stated the change worked out better for him.  Per Dr. Friedman, pt does not need a hospital setting.    Procedure was confirmed, updated Procedure Instruction Sheet was emailed.     Scheduled date of colonoscopy/EGD (as of today): 02/21/2024  Physician performing colonoscopy: Dr. Friedman   Location of colonoscopy:   Bowel prep reviewed with patient: 2 Day Giuseppe   Instructions reviewed with patient by: Marisabel new prep sheet emailed 02/16/2024  Clearances: N/A

## 2024-02-19 ENCOUNTER — ANESTHESIA EVENT (OUTPATIENT)
Dept: ANESTHESIOLOGY | Facility: HOSPITAL | Age: 44
End: 2024-02-19

## 2024-02-19 ENCOUNTER — ANESTHESIA (OUTPATIENT)
Dept: ANESTHESIOLOGY | Facility: HOSPITAL | Age: 44
End: 2024-02-19

## 2024-02-19 RX ORDER — SODIUM CHLORIDE 9 MG/ML
125 INJECTION, SOLUTION INTRAVENOUS CONTINUOUS
Status: CANCELLED | OUTPATIENT
Start: 2024-02-19

## 2024-02-21 ENCOUNTER — ANESTHESIA EVENT (OUTPATIENT)
Dept: GASTROENTEROLOGY | Facility: MEDICAL CENTER | Age: 44
End: 2024-02-21

## 2024-02-21 ENCOUNTER — HOSPITAL ENCOUNTER (OUTPATIENT)
Dept: GASTROENTEROLOGY | Facility: MEDICAL CENTER | Age: 44
Setting detail: OUTPATIENT SURGERY
Discharge: HOME | End: 2024-02-21
Payer: COMMERCIAL

## 2024-02-21 ENCOUNTER — ANESTHESIA (OUTPATIENT)
Dept: GASTROENTEROLOGY | Facility: MEDICAL CENTER | Age: 44
End: 2024-02-21

## 2024-02-21 VITALS
RESPIRATION RATE: 18 BRPM | HEART RATE: 65 BPM | BODY MASS INDEX: 32.96 KG/M2 | DIASTOLIC BLOOD PRESSURE: 59 MMHG | WEIGHT: 210 LBS | OXYGEN SATURATION: 96 % | HEIGHT: 67 IN | TEMPERATURE: 97.3 F | SYSTOLIC BLOOD PRESSURE: 123 MMHG

## 2024-02-21 DIAGNOSIS — K59.00 ACUTE CONSTIPATION: ICD-10-CM

## 2024-02-21 DIAGNOSIS — K21.9 CHRONIC GERD: ICD-10-CM

## 2024-02-21 DIAGNOSIS — K20.90 ESOPHAGITIS: Primary | ICD-10-CM

## 2024-02-21 DIAGNOSIS — R10.13 EPIGASTRIC PAIN: ICD-10-CM

## 2024-02-21 PROCEDURE — 45385 COLONOSCOPY W/LESION REMOVAL: CPT | Performed by: INTERNAL MEDICINE

## 2024-02-21 PROCEDURE — 43251 EGD REMOVE LESION SNARE: CPT | Performed by: INTERNAL MEDICINE

## 2024-02-21 PROCEDURE — 88313 SPECIAL STAINS GROUP 2: CPT | Performed by: STUDENT IN AN ORGANIZED HEALTH CARE EDUCATION/TRAINING PROGRAM

## 2024-02-21 PROCEDURE — 88305 TISSUE EXAM BY PATHOLOGIST: CPT | Performed by: STUDENT IN AN ORGANIZED HEALTH CARE EDUCATION/TRAINING PROGRAM

## 2024-02-21 PROCEDURE — 43239 EGD BIOPSY SINGLE/MULTIPLE: CPT | Performed by: INTERNAL MEDICINE

## 2024-02-21 RX ORDER — PROPOFOL 10 MG/ML
INJECTION, EMULSION INTRAVENOUS AS NEEDED
Status: DISCONTINUED | OUTPATIENT
Start: 2024-02-21 | End: 2024-02-21

## 2024-02-21 RX ORDER — SODIUM CHLORIDE 9 MG/ML
125 INJECTION, SOLUTION INTRAVENOUS CONTINUOUS
Status: DISCONTINUED | OUTPATIENT
Start: 2024-02-21 | End: 2024-02-25 | Stop reason: HOSPADM

## 2024-02-21 RX ORDER — LIDOCAINE HYDROCHLORIDE 20 MG/ML
INJECTION, SOLUTION EPIDURAL; INFILTRATION; INTRACAUDAL; PERINEURAL AS NEEDED
Status: DISCONTINUED | OUTPATIENT
Start: 2024-02-21 | End: 2024-02-21

## 2024-02-21 RX ORDER — OMEPRAZOLE 40 MG/1
40 CAPSULE, DELAYED RELEASE ORAL
Qty: 60 CAPSULE | Refills: 2 | Status: SHIPPED | OUTPATIENT
Start: 2024-02-21 | End: 2024-05-21

## 2024-02-21 RX ADMIN — PROPOFOL 50 MG: 10 INJECTION, EMULSION INTRAVENOUS at 10:44

## 2024-02-21 RX ADMIN — PROPOFOL 200 MG: 10 INJECTION, EMULSION INTRAVENOUS at 10:39

## 2024-02-21 RX ADMIN — PROPOFOL 50 MG: 10 INJECTION, EMULSION INTRAVENOUS at 10:41

## 2024-02-21 RX ADMIN — PROPOFOL 40 MG: 10 INJECTION, EMULSION INTRAVENOUS at 10:56

## 2024-02-21 RX ADMIN — PROPOFOL 40 MG: 10 INJECTION, EMULSION INTRAVENOUS at 11:08

## 2024-02-21 RX ADMIN — PROPOFOL 40 MG: 10 INJECTION, EMULSION INTRAVENOUS at 11:15

## 2024-02-21 RX ADMIN — PROPOFOL 50 MG: 10 INJECTION, EMULSION INTRAVENOUS at 10:47

## 2024-02-21 RX ADMIN — PROPOFOL 40 MG: 10 INJECTION, EMULSION INTRAVENOUS at 11:13

## 2024-02-21 RX ADMIN — PROPOFOL 40 MG: 10 INJECTION, EMULSION INTRAVENOUS at 11:06

## 2024-02-21 RX ADMIN — PROPOFOL 40 MG: 10 INJECTION, EMULSION INTRAVENOUS at 11:01

## 2024-02-21 RX ADMIN — SODIUM CHLORIDE 125 ML/HR: 0.9 INJECTION, SOLUTION INTRAVENOUS at 10:33

## 2024-02-21 RX ADMIN — PROPOFOL 40 MG: 10 INJECTION, EMULSION INTRAVENOUS at 10:54

## 2024-02-21 RX ADMIN — Medication 40 MG: at 10:59

## 2024-02-21 RX ADMIN — PROPOFOL 50 MG: 10 INJECTION, EMULSION INTRAVENOUS at 10:43

## 2024-02-21 RX ADMIN — PROPOFOL 40 MG: 10 INJECTION, EMULSION INTRAVENOUS at 11:04

## 2024-02-21 RX ADMIN — LIDOCAINE HYDROCHLORIDE 100 MG: 20 INJECTION, SOLUTION EPIDURAL; INFILTRATION; INTRACAUDAL at 10:39

## 2024-02-21 RX ADMIN — PROPOFOL 50 MG: 10 INJECTION, EMULSION INTRAVENOUS at 10:45

## 2024-02-21 RX ADMIN — PROPOFOL 40 MG: 10 INJECTION, EMULSION INTRAVENOUS at 10:52

## 2024-02-21 RX ADMIN — PROPOFOL 40 MG: 10 INJECTION, EMULSION INTRAVENOUS at 11:11

## 2024-02-21 RX ADMIN — PROPOFOL 30 MG: 10 INJECTION, EMULSION INTRAVENOUS at 10:59

## 2024-02-21 RX ADMIN — PROPOFOL 40 MG: 10 INJECTION, EMULSION INTRAVENOUS at 10:50

## 2024-02-21 RX ADMIN — PROPOFOL 40 MG: 10 INJECTION, EMULSION INTRAVENOUS at 10:48

## 2024-02-21 NOTE — ANESTHESIA PREPROCEDURE EVALUATION
Procedure:  EGD  COLONOSCOPY    Relevant Problems   GI/HEPATIC   (+) GERD without esophagitis                                Physical Exam    Airway    Mallampati score: III  TM Distance: >3 FB  Neck ROM: full     Dental       Cardiovascular  Rhythm: regular    Pulmonary   Breath sounds clear to auscultation    Other Findings        Anesthesia Plan  ASA Score- 2     Anesthesia Type- IV sedation with anesthesia with ASA Monitors.         Additional Monitors:     Airway Plan:            Plan Factors-Exercise tolerance (METS): >4 METS.    Chart reviewed.   Existing labs reviewed.     Patient is not a current smoker.              Induction- intravenous.    Postoperative Plan-     Informed Consent- Anesthetic plan and risks discussed with patient.

## 2024-02-21 NOTE — H&P
"History and Physical - [facility]  Maciel Alford 43 y.o. male MRN: [id number]                  HPI: Maciel Alford is a 43 y.o. year old male who presents for EGD for Becerril's screening, investigate abdominal pain and colonoscopy to investigate acute constipation.      REVIEW OF SYSTEMS: Per the HPI, and otherwise unremarkable.    Historical Information   Past Medical History:   Diagnosis Date    Anxiety     Depression      Past Surgical History:   Procedure Laterality Date    WISDOM TOOTH EXTRACTION       Social History   Social History     Substance and Sexual Activity   Alcohol Use Yes    Comment: occaisionally     Social History     Substance and Sexual Activity   Drug Use Never     Social History     Tobacco Use   Smoking Status Never   Smokeless Tobacco Never     Family History   Problem Relation Age of Onset    Colon cancer Paternal Uncle                Meds/Allergies     (Not in a hospital admission)      Allergies   Allergen Reactions    Amoxicillin     Penicillins        Objective     Height 5' 7\" (1.702 m), weight 95.3 kg (210 lb).      PHYSICAL EXAM    Gen: NAD  CV: RRR  CHEST: Clear  ABD: soft, NT/ND  EXT: no edema      ASSESSMENT/PLAN:  Maciel Alford is a 43 y.o. year old male who presents for EGD for Becerril's screening, investigate abdominal pain and colonoscopy to investigate acute constipation. The patient is stable and optimized for the procedure, we reviewed risk and benefits. Risk include but not limited to infection, bleeding, perforation and missing a lesion.          "

## 2024-02-21 NOTE — ANESTHESIA POSTPROCEDURE EVALUATION
"Post-Op Assessment Note    CV Status:  Stable    Pain management: adequate       Mental Status:  Alert and awake   Hydration Status:  Euvolemic   PONV Controlled:  Controlled   Airway Patency:  Patent     Post Op Vitals Reviewed: Yes    No anethesia notable event occurred.    Staff: Anesthesiologist               BP      Temp      Pulse     Resp      SpO2      /59   Pulse 65   Temp (!) 97.3 °F (36.3 °C) (Temporal)   Resp 18   Ht 5' 7\" (1.702 m)   Wt 95.3 kg (210 lb)   SpO2 96%   BMI 32.89 kg/m²     "

## 2024-02-27 PROCEDURE — 88305 TISSUE EXAM BY PATHOLOGIST: CPT | Performed by: STUDENT IN AN ORGANIZED HEALTH CARE EDUCATION/TRAINING PROGRAM

## 2024-02-27 PROCEDURE — 88313 SPECIAL STAINS GROUP 2: CPT | Performed by: STUDENT IN AN ORGANIZED HEALTH CARE EDUCATION/TRAINING PROGRAM

## 2024-02-28 ENCOUNTER — PREP FOR PROCEDURE (OUTPATIENT)
Dept: GASTROENTEROLOGY | Facility: CLINIC | Age: 44
End: 2024-02-28

## 2024-02-28 ENCOUNTER — TELEPHONE (OUTPATIENT)
Dept: GASTROENTEROLOGY | Facility: CLINIC | Age: 44
End: 2024-02-28

## 2024-02-28 DIAGNOSIS — K20.90 ESOPHAGITIS: Primary | ICD-10-CM

## 2024-03-13 ENCOUNTER — TELEPHONE (OUTPATIENT)
Age: 44
End: 2024-03-13

## 2024-03-13 DIAGNOSIS — K20.90 ESOPHAGITIS: ICD-10-CM

## 2024-03-13 NOTE — TELEPHONE ENCOUNTER
PA for omeprazole      Submitted via    [x]CMM-KEY [id number]   []SurescriPrezto-Case ID #   []Faxed to plan   []Other website   []Phone call Case ID #     Office notes sent, clinical questions answered. Awaiting determination    Turnaround time for your insurance to make a decision on your Prior Authorization can take 7-21 business days.

## 2024-03-13 NOTE — TELEPHONE ENCOUNTER
"----- Message from Marisabel Upton sent at 3/13/2024 12:21 PM EDT -----  Regarding: FW: Ariana  Contact: [phone]    ----- Message -----  From: Maciel Alford \"Maciel Alford\"  Sent: 3/13/2024  12:07 PM EDT  To: [facility]  Subject: Priorauth                                        Hello, the Peilosec  prescribed was never filled because they are waiting for a prior authorization.    "

## 2024-03-14 ENCOUNTER — TELEPHONE (OUTPATIENT)
Age: 44
End: 2024-03-14

## 2024-03-14 RX ORDER — OMEPRAZOLE 40 MG/1
CAPSULE, DELAYED RELEASE ORAL
Qty: 180 CAPSULE | Refills: 1 | Status: SHIPPED | OUTPATIENT
Start: 2024-03-14

## 2024-03-14 NOTE — TELEPHONE ENCOUNTER
Approved    Prior authorization approved  Payer: [employer] Case ID: [id number]    [phone]  Note from payer: The case has been Approved from  20240313 to [id number]  Approval Details    Authorized from March 13, 2024 to March 13, 2025  Electronic appeal: Not supported  View History  Medication Being Authorized    omeprazole (PriLOSEC) 40 MG capsule  Take 1 capsule (40 mg total) by mouth 2 (two) times a day before meals  Dispense: 60 capsule Refills: 2     PA for omeprazole Approved   Date(s) approved until 3/13/2025  Case # [id number]    Patient advised by [x] [employer] Message                      [] Phone call       Pharmacy advised by [x]Fax                                     []Phone call    Approval letter NOT scanned into Media No , no letter recvd             Detail Level: Zone

## 2024-03-14 NOTE — TELEPHONE ENCOUNTER
Called pt to advise Medication Omeprazole has been approved by the insurance. Your pharmacy has been made aware of your approval, please call them to see when your medication will be available for .    []Spoke with pt. Verbal understanding  [x]LMOM   []L/M to call office back. Communication consent not updated in chart  []Other

## 2024-03-21 ENCOUNTER — TRANSCRIBE ORDERS (OUTPATIENT)
Dept: GASTROENTEROLOGY | Facility: CLINIC | Age: 44
End: 2024-03-21

## 2024-03-26 ENCOUNTER — NURSE TRIAGE (OUTPATIENT)
Age: 44
End: 2024-03-26

## 2024-03-26 NOTE — TELEPHONE ENCOUNTER
Received call from patient's wife reporting that patient has had a fever since yesterday, sore throat, cough and congestion. Took a home Covid test and it was negative. Care advice given.  Patient would like to be seen in the office today. No available appointments. Checked with Kristina in office, nothing available today. Offered PCP virtual visit, wife declined. Patient does not wish to go to urgent care. Chose to schedule OV tomorrow at 7:45 am.

## 2024-03-26 NOTE — TELEPHONE ENCOUNTER
"Reason for Disposition   Patient wants to be seen    Answer Assessment - Initial Assessment Questions  1. TEMPERATURE: \"What is the most recent temperature?\"  \"How was it measured?\"       101.3, just prior to call  2. ONSET: \"When did the fever start?\"       Started yesterday  3. SYMPTOMS: \"Do you have any other symptoms besides the fever?\"  (e.g., colds, headache, sore throat, earache, cough, rash, diarrhea, vomiting, abdominal pain)      Congestion, dry cough, burning in throar  4. CAUSE: If there are no symptoms, ask: \"What do you think is causing the fever?\"       unsure  5. CONTACTS: \"Does anyone else in the family have an infection?\"      no  6. TREATMENT: \"What have you done so far to treat this fever?\" (e.g., medications)      Motrin brought down to 99.9, also has used dayquil  7. IMMUNOCOMPROMISE: \"Do you have of the following: diabetes, HIV positive, splenectomy, cancer chemotherapy, chronic steroid treatment, transplant patient, etc.\"      no  8. PREGNANCY: \"Is there any chance you are pregnant?\" \"When was your last menstrual period?\"      N/a  9. TRAVEL: \"Have you traveled out of the country in the last month?\" (e.g., travel history, exposures)      No    Protocols used: Fever-ADULT-[place]    "

## 2024-03-27 ENCOUNTER — TELEMEDICINE (OUTPATIENT)
Dept: FAMILY MEDICINE CLINIC | Facility: CLINIC | Age: 44
End: 2024-03-27
Payer: COMMERCIAL

## 2024-03-27 DIAGNOSIS — J10.1 INFLUENZA B: Primary | ICD-10-CM

## 2024-03-27 DIAGNOSIS — R05.9 COUGH, UNSPECIFIED TYPE: ICD-10-CM

## 2024-03-27 PROCEDURE — 99213 OFFICE O/P EST LOW 20 MIN: CPT | Performed by: FAMILY MEDICINE

## 2024-03-27 RX ORDER — OSELTAMIVIR PHOSPHATE 75 MG/1
75 CAPSULE ORAL EVERY 12 HOURS SCHEDULED
Qty: 10 CAPSULE | Refills: 0 | Status: SHIPPED | OUTPATIENT
Start: 2024-03-27 | End: 2024-04-01

## 2024-03-27 RX ORDER — AZITHROMYCIN 250 MG/1
TABLET, FILM COATED ORAL DAILY
Qty: 6 TABLET | Refills: 0 | Status: SHIPPED | OUTPATIENT
Start: 2024-03-27 | End: 2024-04-01

## 2024-03-27 NOTE — PATIENT INSTRUCTIONS
Needs work note and has influenza B tested at wife's work place. Has some cough but not productive. Covid 19 test wnl at home. Start Tamiflu and abx as directed and may use Tylenol prn fever or pain and Dimetapp DM cold and cough prn. Call if not better or worse or may go to ER or urgent care. Stay well hydrated. Work excuse from today until Sunday.

## 2024-03-27 NOTE — PROGRESS NOTES
Virtual Regular Visit    Verification of patient location:    Patient is located at Home in the following state in which I hold an active license [place]      Assessment/Plan:    Problem List Items Addressed This Visit    None  Visit Diagnoses       Influenza B    -  Primary    Relevant Medications    oseltamivir (TAMIFLU) 75 mg capsule    Cough, unspecified type        Relevant Medications    azithromycin (Zithromax) 250 mg tablet                 Reason for visit is   Chief Complaint   Patient presents with    Virtual Regular Visit          Encounter provider Marco Quintero DO    Provider located at [facility]  [address] [place] [address]  [address]  [place] [address] [phone]      Recent Visits  No visits were found meeting these conditions.  Showing recent visits within past 7 days and meeting all other requirements  Today's Visits  Date Type Provider Dept   03/27/24 Telemedicine Marco Quintero DO [facility]   Showing today's visits and meeting all other requirements  Future Appointments  No visits were found meeting these conditions.  Showing future appointments within next 150 days and meeting all other requirements       The patient was identified by name and date of birth. Maciel Alford was informed that this is a telemedicine visit and that the visit is being conducted through the [employer] platform. He agrees to proceed..  My office door was closed. No one else was in the room.  He acknowledged consent and understanding of privacy and security of the video platform. The patient has agreed to participate and understands they can discontinue the visit at any time.    Patient is aware this is a billable service.     Subjective  Maciel Alford is a 43 y.o. male fever, congestion, 3/26 flu b +   pt chnged appt 3/27 to [place] for being ill and needing work release  .      fever, congestion, 3/26 flu b +   pt chnged appt 3/27 to [place] for being  ill and needing work release   Taking dayquil and nyquil and advil. Patient was positive for influ B in office. Temp today at 100.          Past Medical History:   Diagnosis Date    Anxiety     Depression        Past Surgical History:   Procedure Laterality Date    WISDOM TOOTH EXTRACTION         Current Outpatient Medications   Medication Sig Dispense Refill    azithromycin (Zithromax) 250 mg tablet Take 2 tablets (500 mg total) by mouth daily for 1 day, THEN 1 tablet (250 mg total) daily for 4 days. 6 tablet 0    oseltamivir (TAMIFLU) 75 mg capsule Take 1 capsule (75 mg total) by mouth every 12 (twelve) hours for 5 days 10 capsule 0    ALPRAZolam (XANAX) 0.25 mg tablet Take half to 1 tablet daily as needed for severe stress 10 tablet 0    bisacodyl (DULCOLAX) 5 mg EC tablet Take 4 tablets (20 mg total) by mouth once for 1 dose Colonoscopy prep 4 tablet 0    cetirizine (ZyrTEC) 10 mg tablet Take 10 mg by mouth as needed for allergies      fluticasone (FLONASE) 50 mcg/act nasal spray 1 spray into each nostril daily 1 Bottle 0    omeprazole (PriLOSEC) 40 MG capsule TAKE 1 CAPSULE BY MOUTH TWICE DAILY BEFORE MEALS 180 capsule 1    polyethylene glycol (GOLYTELY) 4000 mL solution Take 4,000 mL by mouth once for 1 dose Colonoscopy prep 4000 mL 0    polyethylene glycol (MIRALAX) 17 g packet Take 17 g by mouth daily 510 g 5     No current facility-administered medications for this visit.        Allergies   Allergen Reactions    Amoxicillin     Penicillins        Review of Systems   Constitutional:  Positive for fever.   HENT: Negative.     Eyes: Negative.    Respiratory:  Positive for cough.    Cardiovascular: Negative.    Gastrointestinal: Negative.    Endocrine: Negative.    Genitourinary: Negative.    Musculoskeletal: Negative.    Skin: Negative.    Allergic/Immunologic: Negative.    Neurological: Negative.    Hematological: Negative.    Psychiatric/Behavioral: Negative.         Video Exam    There were no vitals filed  for this visit.    Physical Exam  Constitutional:       Appearance: Normal appearance.   Pulmonary:      Effort: Pulmonary effort is normal. No respiratory distress.      Comments: cough  Neurological:      General: No focal deficit present.      Mental Status: He is alert and oriented to person, place, and time.   Psychiatric:         Mood and Affect: Mood normal.         Behavior: Behavior normal.         Thought Content: Thought content normal.         Judgment: Judgment normal.       Patient Instructions   Needs work note and has influenza B tested at wife's work place. Has some cough but not productive. Covid 19 test wnl at home. Start Tamiflu and abx as directed and may use Tylenol prn fever or pain and Dimetapp DM cold and cough prn. Call if not better or worse or may go to ER or urgent care. Stay well hydrated. Work excuse from today until Sunday.        Visit Time  Total Visit Duration: 15

## 2024-05-14 ENCOUNTER — ANESTHESIA EVENT (OUTPATIENT)
Dept: ANESTHESIOLOGY | Facility: HOSPITAL | Age: 44
End: 2024-05-14

## 2024-05-14 ENCOUNTER — ANESTHESIA (OUTPATIENT)
Dept: ANESTHESIOLOGY | Facility: HOSPITAL | Age: 44
End: 2024-05-14

## 2024-05-26 RX ORDER — SODIUM CHLORIDE 9 MG/ML
125 INJECTION, SOLUTION INTRAVENOUS CONTINUOUS
Status: CANCELLED | OUTPATIENT
Start: 2024-05-26

## 2024-05-29 ENCOUNTER — ANESTHESIA (OUTPATIENT)
Dept: GASTROENTEROLOGY | Facility: MEDICAL CENTER | Age: 44
End: 2024-05-29

## 2024-05-29 ENCOUNTER — HOSPITAL ENCOUNTER (OUTPATIENT)
Dept: GASTROENTEROLOGY | Facility: MEDICAL CENTER | Age: 44
Setting detail: OUTPATIENT SURGERY
Discharge: HOME | End: 2024-05-29
Payer: COMMERCIAL

## 2024-05-29 ENCOUNTER — ANESTHESIA EVENT (OUTPATIENT)
Dept: GASTROENTEROLOGY | Facility: MEDICAL CENTER | Age: 44
End: 2024-05-29

## 2024-05-29 VITALS
BODY MASS INDEX: 32.96 KG/M2 | WEIGHT: 210 LBS | OXYGEN SATURATION: 97 % | HEART RATE: 76 BPM | RESPIRATION RATE: 16 BRPM | SYSTOLIC BLOOD PRESSURE: 111 MMHG | TEMPERATURE: 97.6 F | DIASTOLIC BLOOD PRESSURE: 58 MMHG | HEIGHT: 67 IN

## 2024-05-29 DIAGNOSIS — K20.90 ESOPHAGITIS: ICD-10-CM

## 2024-05-29 PROCEDURE — 43239 EGD BIOPSY SINGLE/MULTIPLE: CPT | Performed by: INTERNAL MEDICINE

## 2024-05-29 PROCEDURE — 88305 TISSUE EXAM BY PATHOLOGIST: CPT | Performed by: PATHOLOGY

## 2024-05-29 RX ORDER — SODIUM CHLORIDE 9 MG/ML
INJECTION, SOLUTION INTRAVENOUS CONTINUOUS PRN
Status: DISCONTINUED | OUTPATIENT
Start: 2024-05-29 | End: 2024-05-29

## 2024-05-29 RX ORDER — LIDOCAINE HCL/PF 100 MG/5ML
SYRINGE (ML) INJECTION AS NEEDED
Status: DISCONTINUED | OUTPATIENT
Start: 2024-05-29 | End: 2024-05-29

## 2024-05-29 RX ORDER — PROPOFOL 10 MG/ML
INJECTION, EMULSION INTRAVENOUS AS NEEDED
Status: DISCONTINUED | OUTPATIENT
Start: 2024-05-29 | End: 2024-05-29

## 2024-05-29 RX ORDER — SODIUM CHLORIDE 9 MG/ML
125 INJECTION, SOLUTION INTRAVENOUS CONTINUOUS
Status: DISCONTINUED | OUTPATIENT
Start: 2024-05-29 | End: 2024-06-02 | Stop reason: HOSPADM

## 2024-05-29 RX ORDER — OMEPRAZOLE 40 MG/1
40 CAPSULE, DELAYED RELEASE ORAL DAILY
Qty: 30 CAPSULE | Refills: 5 | Status: SHIPPED | OUTPATIENT
Start: 2024-05-29 | End: 2024-11-25

## 2024-05-29 RX ADMIN — PROPOFOL 50 MG: 10 INJECTION, EMULSION INTRAVENOUS at 13:35

## 2024-05-29 RX ADMIN — PROPOFOL 50 MG: 10 INJECTION, EMULSION INTRAVENOUS at 13:41

## 2024-05-29 RX ADMIN — PROPOFOL 100 MG: 10 INJECTION, EMULSION INTRAVENOUS at 13:34

## 2024-05-29 RX ADMIN — SODIUM CHLORIDE 125 ML/HR: 0.9 INJECTION, SOLUTION INTRAVENOUS at 13:26

## 2024-05-29 RX ADMIN — PROPOFOL 50 MG: 10 INJECTION, EMULSION INTRAVENOUS at 13:36

## 2024-05-29 RX ADMIN — SODIUM CHLORIDE: 0.9 INJECTION, SOLUTION INTRAVENOUS at 13:32

## 2024-05-29 RX ADMIN — LIDOCAINE HYDROCHLORIDE 100 MG: 20 INJECTION INTRAVENOUS at 13:34

## 2024-05-29 RX ADMIN — PROPOFOL 50 MG: 10 INJECTION, EMULSION INTRAVENOUS at 13:39

## 2024-05-29 NOTE — H&P
[facility] and Physical -  [facility]  Maciel Alford 43 y.o. male MRN: [id number]                  HPI: Maciel Alford is a 43 y.o. year old male who presents for EGD due to history of esophagitis.    REVIEW OF SYSTEMS: Per the HPI, and otherwise unremarkable.    Historical Information   Past Medical History:   Diagnosis Date    Anxiety     Depression      Past Surgical History:   Procedure Laterality Date    WISDOM TOOTH EXTRACTION       Social History   Social History     Substance and Sexual Activity   Alcohol Use Yes    Comment: occaisionally     Social History     Substance and Sexual Activity   Drug Use Never     Social History     Tobacco Use   Smoking Status Never   Smokeless Tobacco Never     Family History   Problem Relation Age of Onset    Colon cancer Paternal Uncle                Meds/Allergies     Not in a hospital admission.    Allergies   Allergen Reactions    Amoxicillin     Penicillins        Objective     There were no vitals taken for this visit.      PHYSICAL EXAM    Gen: NAD  CV: RRR  CHEST: Clear  ABD: soft, NT/ND  EXT: no edema      ASSESSMENT/PLAN:   Maciel Alford is a 43 y.o. year old male who presents for EGD due to history of esophagitis. The patient is stable and optimized for the procedure, we reviewed risk and benefits. Risk include but not limited to infection, bleeding, perforation and missing a lesion.

## 2024-05-29 NOTE — ANESTHESIA PREPROCEDURE EVALUATION
Procedure:  EGD    Relevant Problems   GI/HEPATIC   (+) GERD without esophagitis      MUSCULOSKELETAL   (+) Sciatica      NEURO/PSYCH   (+) Anxiety   (+) Depression with anxiety        Physical Exam    Airway    Mallampati score: II  TM Distance: >3 FB  Neck ROM: full     Dental       Cardiovascular      Pulmonary      Other Findings        Anesthesia Plan  ASA Score- 2     Anesthesia Type- IV sedation with anesthesia with ASA Monitors.         Additional Monitors:     Airway Plan:            Plan Factors-Exercise tolerance (METS): >4 METS.    Chart reviewed.                      Induction- intravenous.    Postoperative Plan-     Perioperative Resuscitation Plan - Level 1 - Full Code.       Informed Consent- Anesthetic plan and risks discussed with patient.  I personally reviewed this patient with the CRNA. Discussed and agreed on the Anesthesia Plan with the CRNA..    NPO appropriate. Discussed benefits/risks of monitored anesthetic care and discussed providing a dynamic level of mild to deep sedation. Risks include awareness, airway obstruction, aspiration which may necessitate conversion to general anesthesia. All questions answered. Patient understands and wishes to proceed.    Anesthesia plan and consent discussed with Maciel who expressed understanding and agreement. Risks/benefits and alternatives discussed with patient including possible PONV, sore throat, damage to teeth/lips/gums and possibility of rare anesthetic and surgical emergencies.

## 2024-05-29 NOTE — ANESTHESIA POSTPROCEDURE EVALUATION
Post-Op Assessment Note    CV Status:  Stable  Pain Score: 0    Pain management: adequate    Multimodal analgesia used between 6 hours prior to anesthesia start to PACU discharge    Mental Status:  Alert   Hydration Status:  Stable   PONV Controlled:  None   Airway Patency:  Patent  Two or more mitigation strategies used for obstructive sleep apnea   Post Op Vitals Reviewed: Yes    No anethesia notable event occurred.    Staff: Anesthesiologist               /57 (05/29/24 1347)    Temp      Pulse 81 (05/29/24 1347)   Resp 18 (05/29/24 1347)    SpO2 98 % (05/29/24 1347)

## 2024-05-31 PROCEDURE — 88305 TISSUE EXAM BY PATHOLOGIST: CPT | Performed by: PATHOLOGY

## 2024-06-04 ENCOUNTER — OFFICE VISIT (OUTPATIENT)
Dept: GASTROENTEROLOGY | Facility: CLINIC | Age: 44
End: 2024-06-04
Payer: COMMERCIAL

## 2024-06-04 VITALS
DIASTOLIC BLOOD PRESSURE: 80 MMHG | HEIGHT: 67 IN | SYSTOLIC BLOOD PRESSURE: 118 MMHG | TEMPERATURE: 98.2 F | BODY MASS INDEX: 34.15 KG/M2 | WEIGHT: 217.6 LBS

## 2024-06-04 DIAGNOSIS — K21.9 GERD WITHOUT ESOPHAGITIS: Primary | ICD-10-CM

## 2024-06-04 DIAGNOSIS — K59.09 CHRONIC CONSTIPATION: ICD-10-CM

## 2024-06-04 DIAGNOSIS — K22.70 BARRETT'S ESOPHAGUS WITHOUT DYSPLASIA: ICD-10-CM

## 2024-06-04 DIAGNOSIS — Z00.00 PREVENTATIVE HEALTH CARE: ICD-10-CM

## 2024-06-04 DIAGNOSIS — K21.9 CHRONIC GERD: ICD-10-CM

## 2024-06-04 PROCEDURE — 99214 OFFICE O/P EST MOD 30 MIN: CPT | Performed by: INTERNAL MEDICINE

## 2024-06-04 NOTE — PROGRESS NOTES
[facility] - Outpatient Follow-up Note  Maciel Alford 43 y.o. male MRN: [id number]  Encounter: [id number]          ASSESSMENT AND PLAN:      1.  Chronic GERD  2.  Chronic constipation  3.  Becerril's esophagus  4.  History of colon polyps  5.  Preventative health care    His heartburn and constipation are currently controlled.  I encouraged him to continue omeprazole 40 mg once daily and monitor his symptoms.  Should heartburn worsen in the future, we can increase the dose of omeprazole and investigate further.  We will repeat EGD in 5 years for Becerril's surveillance.  Continue MiraLAX.  Continue with good hydration.  His repeat colonoscopy will be due in 2027 due to history of colon polyps.  Patient had hepatitis B and C screening done in 2016 which were reviewed by me today and found to be negative for the infection.    RTC 1 year.    Nate Friedman MD  Gastroenterology  [facility]  Date: June 4, 2024    ______________________________________________________________________    SUBJECTIVE: 43-year-old with chronic GERD, Becerril's, chronic constipation presents for follow-up.    During last office visit patient reported chronic heartburn, not on medications along with abdominal pain and hard stools.  Constipation had improved with MiraLAX.  EGD was performed which showed esophagitis and stomach polyps.  Patient was started on omeprazole twice daily for 3 months and repeat EGD was performed few days ago which showed healing of esophagitis and small island of Becerril's.  Patient was then continued on omeprazole once daily.  Patient had colonoscopy performed since our last office visit where 4 polyps were removed and were found to be precancerous.  Repeat was recommended in 3 years.    Currently, patient reports that his heartburn is controlled with omeprazole once daily.  He has been taking MiraLAX consistently as well.  He has been having about 2-3 bowel  "movements per day which are normal consistency.  His abdominal pain has resolved.          REVIEW OF SYSTEMS IS OTHERWISE NEGATIVE.      Historical Information   Past Medical History:   Diagnosis Date    Anxiety     Depression     Seasonal allergies      Past Surgical History:   Procedure Laterality Date    COLONOSCOPY      UPPER GASTROINTESTINAL ENDOSCOPY      WISDOM TOOTH EXTRACTION       Social History   Social History     Substance and Sexual Activity   Alcohol Use Yes    Comment: occaisionally     Social History     Substance and Sexual Activity   Drug Use Never     Social History     Tobacco Use   Smoking Status Never   Smokeless Tobacco Never     Family History   Problem Relation Age of Onset    Colon cancer Paternal Uncle                Meds/Allergies       Current Outpatient Medications:     ALPRAZolam (XANAX) 0.25 mg tablet    cetirizine (ZyrTEC) 10 mg tablet    fluticasone (FLONASE) 50 mcg/act nasal spray    omeprazole (PriLOSEC) 40 MG capsule    polyethylene glycol (MIRALAX) 17 g packet    bisacodyl (DULCOLAX) 5 mg EC tablet    polyethylene glycol (GOLYTELY) 4000 mL solution    Allergies   Allergen Reactions    Amoxicillin     Penicillins            Objective     Blood pressure 118/80, temperature 98.2 °F (36.8 °C), height 5' 7\" (1.702 m), weight 98.7 kg (217 lb 9.6 oz). Body mass index is 34.08 kg/m².      PHYSICAL EXAM:      General Appearance:   Alert, cooperative, no distress   HEENT:   Normocephalic, atraumatic, anicteric.     Neck:  Supple, symmetrical, trachea midline   Lungs:   Clear to auscultation bilaterally; no rales, rhonchi or wheezing; respirations unlabored    Heart::   Regular rate and rhythm; no murmur, rub, or gallop.   Abdomen:   Soft, non-tender, non-distended; normal bowel sounds; no masses, no organomegaly    Genitalia:   Deferred    Rectal:   Deferred    Extremities:  No cyanosis, clubbing or edema    Pulses:  2+ and symmetric    Skin:  No jaundice, rashes, or lesions  "   Lymph nodes:  No palpable cervical lymphadenopathy        Lab Results:   No visits with results within 1 Day(s) from this visit.   Latest known visit with results is:   Hospital Outpatient Visit on 05/29/2024   Component Date Value    Case Report 05/29/2024                      Value:Surgical Pathology Report                         Case: [id number]                                  Authorizing Provider:  Nate Friedman MD         Collected:           05/29/2024 1339              Ordering Location:     [facility]        Received:            05/29/2024 [address] [place] [address] Endoscopy                                                     Pathologist:           Kaity Dudley MD                                                                    Specimen:    Esophagus, random bx r/o Becerril's                                                         Final Diagnosis 05/29/2024                      Value:A. Esophagus, random, biopsy:  -   Squamous mucosa with mild reactive changes.  -   No evidence of esophagitis.   -   No columnar mucosa present.    Interpretation performed at [facility], [facility] [place] [address]         Additional Information 05/29/2024                      Value:All reported additional testing was performed with appropriately reactive controls.  These tests were developed and their performance characteristics determined by [facility] or appropriate performing facility, though some tests may be performed on tissues which have not been validated for performance characteristics (such as staining performed on alcohol exposed cell blocks and decalcified tissues).  Results should be interpreted with caution and in the context of the patients’ clinical condition. These tests may not be cleared or approved by the U.S. Food and Drug Administration, though the FDA has determined that such clearance or approval is not necessary. These tests  "are used for clinical purposes and they should not be regarded as investigational or for research. This laboratory has been approved by [place] [id number], designated as a high-complexity laboratory and is qualified to perform these tests.  .      Gross Description 05/29/2024                      Value:A. The specimen is received in formalin, labeled with the patient's name and hospital number, and is designated \" esophagus, random biopsy rule out Becerril's\".  The specimen consists of a single white-tan soft tissue fragment measuring 0.3 cm in greatest dimension.  Entirely submitted. Screened cassette.    Note: The estimated total formalin fixation time based upon information provided by the submitting clinician and the standard processing schedule is under 72 hours.    -[facility]      Clinical Information 05/29/2024                      Value:Per EGD,  INDICATION:  Esophagitis     FINDINGS:  · Becerril's esophagus observed. 1 island was noted; electronic chromoendoscopy (NBI) was used; performed targeted cold forceps biopsy  · Irregular Z-line 40 cm from the incisors  · The stomach and duodenum appeared normal. Stomach was normal on forward and retroflex view. The first and second parts of the duodenum were visualized.           Radiology Results:   EGD    Result Date: 5/29/2024  Narrative: Table formatting from the original result was not included. [facility] [address] [place] [address] [phone] DATE OF SERVICE: 5/29/24 PHYSICIAN(S): Attending: Nate Friedman MD Fellow: No Staff Documented INDICATION: Esophagitis POST-OP DIAGNOSIS: See the impression below. PREPROCEDURE: Informed consent was obtained for the procedure, including sedation.  Risks of perforation, hemorrhage, adverse drug reaction and aspiration were discussed. The patient was placed in the left lateral decubitus position. Patient was explained about the risks and benefits of the procedure. Risks including but not " limited to bleeding, infection, and perforation were explained in detail. Also explained about less than 100% sensitivity with the exam and other alternatives. PROCEDURE: EGD DETAILS OF PROCEDURE: Patient was taken to the procedure room where a time out was performed to confirm correct patient and correct procedure. The patient underwent monitored anesthesia care, which was administered by an anesthesia professional. The patient's blood pressure, heart rate, level of consciousness, respirations, oxygen, ECG and ETCO2 were monitored throughout the procedure. The scope was introduced through the mouth and advanced to the second part of the duodenum. Retroflexion was performed in the fundus. The patient experienced no blood loss. The procedure was not difficult. The patient tolerated the procedure well. There were no apparent adverse events. ANESTHESIA INFORMATION: ASA: II Anesthesia Type: IV Sedation with Anesthesia MEDICATIONS: No administrations occurring from 1332 to 1342 on 05/29/24 FINDINGS: Becerril's esophagus observed. 1 island was noted; electronic chromoendoscopy (NBI) was used; performed targeted cold forceps biopsy Irregular Z-line 40 cm from the incisors The stomach and duodenum appeared normal. Stomach was normal on forward and retroflex view. The first and second parts of the duodenum were visualized. SPECIMENS: ID Type Source Tests Collected by Time Destination 1 : random bx r/o Becerril's Tissue Esophagus TISSUE EXAM Nate Friedman MD 5/29/2024  1:39 PM      Impression: Becerril's esophagus; electronic chromoendoscopy (NBI) was used; performed targeted cold forceps biopsy Irregular Z-line The stomach and duodenum appeared normal. RECOMMENDATION: Await pathology results Continue on omeprazole 40 mg daily to be taken at least 30 to 60 minutes prior to breakfast.   Nate Friedman MD    Answers submitted by the patient for this visit:  Abdominal Pain Questionnaire (Submitted on 6/3/2024)  Chief Complaint:  Abdominal pain  Progression since onset: resolved  Pain - numeric: 0/10  anorexia: No  arthralgias: No  belching: No  constipation: No  diarrhea: No  dysuria: No  fever: No  flatus: No  frequency: No  headaches: No  hematochezia: No  hematuria: No  melena: No  myalgias: No  nausea: No  weight loss: No  vomiting: No

## 2024-06-12 DIAGNOSIS — K20.90 ESOPHAGITIS: ICD-10-CM

## 2024-06-12 RX ORDER — OMEPRAZOLE 40 MG/1
CAPSULE, DELAYED RELEASE ORAL
Qty: 180 CAPSULE | Refills: 1 | Status: SHIPPED | OUTPATIENT
Start: 2024-06-12

## 2024-10-04 ENCOUNTER — TELEPHONE (OUTPATIENT)
Dept: FAMILY MEDICINE CLINIC | Facility: CLINIC | Age: 44
End: 2024-10-04

## 2024-10-04 NOTE — TELEPHONE ENCOUNTER
Please reach out to patient to schedule annual visit and/or update PCP. Please follow process for updating PCP.

## 2024-12-23 ENCOUNTER — OFFICE VISIT (OUTPATIENT)
Age: 44
End: 2024-12-23
Payer: COMMERCIAL

## 2024-12-23 ENCOUNTER — APPOINTMENT (OUTPATIENT)
Age: 44
End: 2024-12-23
Payer: COMMERCIAL

## 2024-12-23 VITALS — BODY MASS INDEX: 34.06 KG/M2 | HEIGHT: 67 IN | WEIGHT: 217 LBS

## 2024-12-23 DIAGNOSIS — M79.18 MYOFASCIAL PAIN: ICD-10-CM

## 2024-12-23 DIAGNOSIS — M54.2 NECK PAIN: Primary | ICD-10-CM

## 2024-12-23 DIAGNOSIS — M54.9 UPPER BACK PAIN: ICD-10-CM

## 2024-12-23 PROCEDURE — 98940 CHIROPRACT MANJ 1-2 REGIONS: CPT | Performed by: CHIROPRACTOR

## 2024-12-23 PROCEDURE — 72050 X-RAY EXAM NECK SPINE 4/5VWS: CPT

## 2024-12-23 PROCEDURE — 99203 OFFICE O/P NEW LOW 30 MIN: CPT | Performed by: CHIROPRACTOR

## 2024-12-23 NOTE — PROGRESS NOTES
Assessment/Plan:    1. Neck pain  XR spine cervical complete 4 or 5 vw non injury      2. Upper back pain        3. Myofascial pain              Review of Diagnostic Studies and Office Notes:      No results found for this or any previous visit.          Decision and Treatment Plan:    I reviewed my findings with the patient today.  Patient was interested in receiving chiropractic care and was treated today.  We will treat the patient 2x/week for the next three weeks and re-evaluate. If there is improvement in symptoms and objective findings, frequency will be reduced.       The patient will be treated with conservative chiropractic care including myofascial release, joint mobilization, and a home stretching and icing program.    The patient was taught 2 stretches today. The patient was advised to do the stretch for 3 sets of 15-20 seconds several times per day.The need to stretch to the point of tension only was discussed.     Patient Instructions:    Return for treatment once next week.   X-rays were ordered to the patient's cervical spine.  Ice 15 minutes as needed for posttreatment soreness.  Stretching twice daily.    Time/Reviewed with Patient:    Time:    At least 33 minutes of time was spent with the patient during the consultation including the patient's history/current complaints, physical exam, reviewing the diagnostic report, reviewing home instruction/reducing risk factors with the patient, reviewing my findings with the patient, and discussing the treatment with the patient.     This is a separate identifiable portion of today's visit from the E and M code billed.    Treatment today consisted of myofascial release to the left temporalis, suboccipital musculature, cervical paraspinals, rhomboids, upper thoracic paraspinals and bilateral upper trapezius.    Manipulation was performed to the thoracic spine utilizing a gentle double thenar contact and to the cervical spine utilizing stairstepping  "technique.  No HVLA was performed to the cervical spine..    Review of Systems   Constitutional:  Negative for chills, fever and unexpected weight change.   Gastrointestinal:  Negative for constipation and diarrhea.   Genitourinary:  Negative for difficulty urinating and urgency.       Subjective:    Maciel Alford is a 44 y.o. male presents today for chiropractic evaluation and possible treatment.  He has a chief complaint of pain between his shoulder blades in the upper back as well as neck pain.  He states he has ongoing stiffness and achiness in his neck and upper back for more than 5 years.  Approximately once every month he gets flareups of neck and upper back pain that are more severe.  They usually last for couple of days.  He does not take any medication even when he has a flareups.  He has not sought any treatment for his neck or back in the past.  He denies pain radiating down his arms, numbness or tingling in the upper extremities.    He does report that he grinds his teeth and gets headaches once per week in the temporal region.  He does wear a nightguard and has been doing so for the past 2 years.  He states his dentist advised him to do so.  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  He also does workout 4 days/week.  He lifts weights twice per week and does cardio twice per week.  He does report that he occasionally self manipulates his neck but just with the range of motion not forced.     He denies fever, chills, night sweats, recent unexplained weight loss, changes in bowel/bladder habits, urinary incontinence or retention.    He does report having had left shoulder pain for years but he never had it checked out.    Objective:     Ht 5' 7\" (1.702 m)   Wt 98.4 kg (217 lb)   BMI 33.99 kg/m²     Appearance: Well developed, well nourished, and in no acute distress    Alert and oriented x 3    Mood/Affect: calm and " pleasant    Gait/Posture:    Gait: Normal    Antalgic posture: None    Posture: He has some mild rounding of the shoulders and anterior head carriage when in the seated position.    Lordosis: Cervical- decreased    Lordosis: Lumbar- normal    Kyphosis: Thoracic- normal    Upper extremity reflexes:    Deep tendon reflexes were normal and symmetrical in the upper extremities.    Upper Extremity Muscle Testing:    Muscle testing of the bilateral upper extremities was normal and graded +5/5.    Contreras's was negative bilaterally.    Cervical Orthopedic Tests:    Maximal foraminal Compression on the Right: negative .    Maximal foraminal Compression on the Left: negative .    Active Cervical Ranges of Motion:    Cervical range of motion examination shows there is full flexion is pain-free.  Extension is not painful.  Left rotation is 10 to 15% restricted but not painful.  Right rotation is full and pain-free.    Left shoulder range of motion is mildly restricted in external rotation and terminal abduction with mild pain.   Right shoulder range of motion is full and pain-free.    Palpation:    A slight upper thoracic curvature may be present as there is slight whole shoulder height difference and posterior rotation of the upper thoracic rib cage minimally..    Mild/moderate hypertonicity and tenderness is noted in the bilateral upper trapezius, bilateral rhomboids, bilateral cervical and upper thoracic paraspinals.  Mid thoracic hypertonicity is noted.  Trigger points are noted in the bilateral rhomboids, bilateral suboccipital musculature, bilateral cervical paraspinals as well as temporalis more so on the left.     Joint dysfunction is present at T3-4, T5-6 [id number].      Dictation voice to text software has been used in the creation of this document. Please consider this in light of any contextual or grammatical errors.

## 2024-12-26 ENCOUNTER — RESULTS FOLLOW-UP (OUTPATIENT)
Age: 44
End: 2024-12-26

## 2025-01-10 DIAGNOSIS — K20.90 ESOPHAGITIS: ICD-10-CM

## 2025-01-10 RX ORDER — OMEPRAZOLE 40 MG/1
40 CAPSULE, DELAYED RELEASE ORAL DAILY
Qty: 90 CAPSULE | Refills: 1 | Status: SHIPPED | OUTPATIENT
Start: 2025-01-10

## 2025-02-10 ENCOUNTER — RA CDI HCC (OUTPATIENT)
Dept: OTHER | Facility: HOSPITAL | Age: 45
End: 2025-02-10

## 2025-02-12 ENCOUNTER — OFFICE VISIT (OUTPATIENT)
Dept: FAMILY MEDICINE CLINIC | Facility: CLINIC | Age: 45
End: 2025-02-12
Payer: COMMERCIAL

## 2025-02-12 VITALS
WEIGHT: 236 LBS | TEMPERATURE: 98 F | HEART RATE: 87 BPM | DIASTOLIC BLOOD PRESSURE: 85 MMHG | HEIGHT: 67 IN | SYSTOLIC BLOOD PRESSURE: 135 MMHG | BODY MASS INDEX: 37.04 KG/M2 | OXYGEN SATURATION: 97 %

## 2025-02-12 DIAGNOSIS — F41.9 ANXIETY: ICD-10-CM

## 2025-02-12 DIAGNOSIS — Z80.0 FAMILY HISTORY OF COLON CANCER: ICD-10-CM

## 2025-02-12 DIAGNOSIS — Z00.00 HEALTH CARE MAINTENANCE: Primary | ICD-10-CM

## 2025-02-12 DIAGNOSIS — J30.2 SEASONAL ALLERGIES: ICD-10-CM

## 2025-02-12 DIAGNOSIS — Z13.220 SCREENING FOR HYPERLIPIDEMIA: ICD-10-CM

## 2025-02-12 DIAGNOSIS — E66.9 OBESITY (BMI 30-39.9): ICD-10-CM

## 2025-02-12 DIAGNOSIS — K21.9 GERD WITHOUT ESOPHAGITIS: ICD-10-CM

## 2025-02-12 PROCEDURE — 99396 PREV VISIT EST AGE 40-64: CPT | Performed by: FAMILY MEDICINE

## 2025-02-12 NOTE — PROGRESS NOTES
Adult Annual Physical  Name: Maciel Alford      : 1980      MRN: [id number]  Encounter Provider: Marco Quintero DO  Encounter Date: 2025   Encounter department: [facility]  Chief Complaint   Patient presents with    Physical Exam     Patient Instructions   Here for general PE and rec updated labs and erwec losing weight via diet and exercise and see GI as directed for colon screeninga nd also see dentist and rec also to use sunscreen and monitor for ticks and use sunglasses when in sun. Jury duty excuse due to anxiety and medical issues.     Assessment & Plan  Health care maintenance    Orders:    Comprehensive metabolic panel; Future    CBC and differential; Future    Lipid Panel with Direct LDL reflex; Future    Hemoglobin A1C; Future    TSH, 3rd generation; Future    T4, free; Future    Anxiety  Stable on xanax prn  Orders:    Comprehensive metabolic panel; Future    CBC and differential; Future    GERD without esophagitis  Stable on med prn  Orders:    CBC and differential; Future    Seasonal allergies  Stable on meds       Family history of colon cancer  UTD and see GI as directed       Obesity (BMI 30-39.9)  Lose weight as directed to get BMI lower than 25    Orders:    Comprehensive metabolic panel; Future    Lipid Panel with Direct LDL reflex; Future    Hemoglobin A1C; Future    TSH, 3rd generation; Future    T4, free; Future    Screening for hyperlipidemia  Check labs.   Orders:    Comprehensive metabolic panel; Future    Lipid Panel with Direct LDL reflex; Future    Hemoglobin A1C; Future    TSH, 3rd generation; Future    T4, free; Future    Immunizations and preventive care screenings were discussed with patient today. Appropriate education was printed on patient's after visit summary.    Discussed risks and benefits of prostate cancer screening. We discussed the controversial history of PSA screening for prostate cancer in the [place] as well as the risk  of over detection and over treatment of prostate cancer by way of PSA screening.  The patient understands that PSA blood testing is an imperfect way to screen for prostate cancer and that elevated PSA levels in the blood may also be caused by infection, inflammation, prostatic trauma or manipulation, urological procedures, or by benign prostatic enlargement.    The role of the digital rectal examination in prostate cancer screening was also discussed and I discussed with him that there is large interobserver variability in the findings of digital rectal examination.    Counseling:  Alcohol/drug use: discussed moderation in alcohol intake, the recommendations for healthy alcohol use, and avoidance of illicit drug use.  Dental Health: discussed importance of regular tooth brushing, flossing, and dental visits.  Injury prevention: discussed safety/seat belts, safety helmets, smoke detectors, carbon monoxide detectors, and smoking near bedding or upholstery.  Sexual health: discussed sexually transmitted diseases, partner selection, use of condoms, avoidance of unintended pregnancy, and contraceptive alternatives.  Exercise: the importance of regular exercise/physical activity was discussed. Recommend exercise 3-5 times per week for at least 30 minutes.       Depression Screening and Follow-up Plan: Patient was screened for depression during today's encounter. They screened negative with a PHQ-9 score of 4.          History of Present Illness     Adult Annual Physical:  Patient presents for annual physical. Here for general PE and is  and has no children and does exercise and does lift weight and does cardio and tries to eat healthy. Non smoker and drinks alcohol socially. Sleeps 6-8 hours of sleep per night. .     Diet and Physical Activity:  - Diet/Nutrition: well balanced diet.  - Exercise: moderate cardiovascular exercise.    Depression Screening:    - PHQ-9 Score: 4    General Health:  - Sleep: 7-8 hours of  "sleep on average.  - Hearing: normal hearing right ear and normal hearing left ear.  - Vision: no vision problems.  - Dental: regular dental visits.     Health:  - History of STDs: no.   - Urinary symptoms: none.     Review of Systems   Constitutional: Negative.    HENT: Negative.     Eyes: Negative.    Respiratory: Negative.     Cardiovascular: Negative.    Gastrointestinal: Negative.    Endocrine: Negative.    Genitourinary: Negative.    Musculoskeletal: Negative.    Skin: Negative.    Allergic/Immunologic: Negative.    Neurological: Negative.    Hematological: Negative.    Psychiatric/Behavioral: Negative.          Anxiety stable on med prn     Current Outpatient Medications on File Prior to Visit   Medication Sig Dispense Refill    ALPRAZolam (XANAX) 0.25 mg tablet Take half to 1 tablet daily as needed for severe stress 10 tablet 0    cetirizine (ZyrTEC) 10 mg tablet Take 10 mg by mouth as needed for allergies      fluticasone (FLONASE) 50 mcg/act nasal spray 1 spray into each nostril daily 1 Bottle 0    omeprazole (PriLOSEC) 40 MG capsule TAKE 1 CAPSULE(40 MG) BY MOUTH DAILY 90 capsule 1    polyethylene glycol (MIRALAX) 17 g packet Take 17 g by mouth daily 510 g 5    [DISCONTINUED] bisacodyl (DULCOLAX) 5 mg EC tablet Take 4 tablets (20 mg total) by mouth once for 1 dose Colonoscopy prep 4 tablet 0    [DISCONTINUED] polyethylene glycol (GOLYTELY) 4000 mL solution Take 4,000 mL by mouth once for 1 dose Colonoscopy prep 4000 mL 0     No current facility-administered medications on file prior to visit.        Objective   /85   Pulse 87   Temp 98 °F (36.7 °C)   Ht 5' 7\" (1.702 m)   Wt 107 kg (236 lb)   SpO2 97%   BMI 36.96 kg/m²     Physical Exam  Constitutional:       Appearance: He is well-developed. He is obese.   HENT:      Head: Normocephalic and atraumatic.      Right Ear: External ear normal.      Left Ear: External ear normal.      Nose: Nose normal.      Mouth/Throat:      Mouth: Mucous " membranes are moist.   Eyes:      Conjunctiva/sclera: Conjunctivae normal.      Pupils: Pupils are equal, round, and reactive to light.   Cardiovascular:      Rate and Rhythm: Normal rate and regular rhythm.      Pulses: Normal pulses.      Heart sounds: Normal heart sounds.   Pulmonary:      Effort: Pulmonary effort is normal.      Breath sounds: Normal breath sounds.   Abdominal:      General: Abdomen is flat. Bowel sounds are normal.      Palpations: Abdomen is soft.   Genitourinary:     Penis: Normal.       Testes: Normal.   Musculoskeletal:         General: Normal range of motion.      Cervical back: Normal range of motion and neck supple.   Skin:     General: Skin is warm and dry.      Capillary Refill: Capillary refill takes less than 2 seconds.   Neurological:      General: No focal deficit present.      Mental Status: He is alert and oriented to person, place, and time. Mental status is at baseline.      Deep Tendon Reflexes: Reflexes are normal and symmetric.   Psychiatric:         Mood and Affect: Mood normal.         Behavior: Behavior normal.         Thought Content: Thought content normal.         Judgment: Judgment normal.      Comments: Anxiety stable on med prn       Administrative Statements   I have spent a total time of 33 minutes in caring for this patient on the day of the visit/encounter including Diagnostic results, Prognosis, Risks and benefits of tx options, Instructions for management, Patient and family education, Importance of tx compliance, Risk factor reductions, Impressions, Counseling / Coordination of care, Documenting in the medical record, Reviewing / ordering tests, medicine, procedures  , and Obtaining or reviewing history  .

## 2025-02-12 NOTE — ASSESSMENT & PLAN NOTE
Stable on xanax prn  Orders:    Comprehensive metabolic panel; Future    CBC and differential; Future

## 2025-02-12 NOTE — PATIENT INSTRUCTIONS
Here for general PE and rec updated labs and erwec losing weight via diet and exercise and see GI as directed for colon screeninga nd also see dentist and rec also to use sunscreen and monitor for ticks and use sunglasses when in sun. Jury duty excuse due to anxiety and medical issues.

## 2025-02-12 NOTE — ASSESSMENT & PLAN NOTE
Lose weight as directed to get BMI lower than 25    Orders:    Comprehensive metabolic panel; Future    Lipid Panel with Direct LDL reflex; Future    Hemoglobin A1C; Future    TSH, 3rd generation; Future    T4, free; Future

## 2025-02-20 DIAGNOSIS — N52.9 ERECTILE DYSFUNCTION, UNSPECIFIED ERECTILE DYSFUNCTION TYPE: Primary | ICD-10-CM

## 2025-02-20 RX ORDER — SILDENAFIL 50 MG/1
50 TABLET, FILM COATED ORAL DAILY PRN
Qty: 10 TABLET | Refills: 0 | Status: SHIPPED | OUTPATIENT
Start: 2025-02-20

## 2025-02-20 RX ORDER — SILDENAFIL 50 MG/1
50 TABLET, FILM COATED ORAL DAILY PRN
Qty: 10 TABLET | Refills: 0 | Status: SHIPPED | OUTPATIENT
Start: 2025-02-20 | End: 2025-02-20

## 2025-02-25 ENCOUNTER — RESULTS FOLLOW-UP (OUTPATIENT)
Dept: FAMILY MEDICINE CLINIC | Facility: CLINIC | Age: 45
End: 2025-02-25

## 2025-02-25 LAB
ALBUMIN SERPL-MCNC: 4.7 G/DL (ref 3.5–5.7)
ALP SERPL-CCNC: 67 U/L (ref 35–120)
ALT SERPL-CCNC: 34 U/L
ANION GAP SERPL CALCULATED.3IONS-SCNC: 7 MMOL/L (ref 3–11)
AST SERPL-CCNC: 26 U/L
BASOPHILS # BLD AUTO: 0 THOU/CMM (ref 0–0.1)
BASOPHILS NFR BLD AUTO: 1 %
BILIRUB SERPL-MCNC: 0.7 MG/DL (ref 0.2–1)
BUN SERPL-MCNC: 16 MG/DL (ref 7–28)
CALCIUM SERPL-MCNC: 9.4 MG/DL (ref 8.5–10.5)
CHLORIDE SERPL-SCNC: 103 MMOL/L (ref 100–109)
CHOLEST SERPL-MCNC: 206 MG/DL
CHOLEST/HDLC SERPL: 4.3 {RATIO}
CO2 SERPL-SCNC: 30 MMOL/L (ref 21–31)
CREAT SERPL-MCNC: 0.91 MG/DL (ref 0.53–1.3)
CYTOLOGY CMNT CVX/VAG CYTO-IMP: NORMAL
DIFFERENTIAL METHOD BLD: ABNORMAL
EOSINOPHIL # BLD AUTO: 0.1 THOU/CMM (ref 0–0.5)
EOSINOPHIL NFR BLD AUTO: 2 %
ERYTHROCYTE [DISTWIDTH] IN BLOOD BY AUTOMATED COUNT: 15.3 % (ref 12–16)
EST. AVERAGE GLUCOSE BLD GHB EST-MCNC: 128 MG/DL
GFR/BSA.PRED SERPLBLD CYS-BASED-ARV: 105 ML/MIN/{1.73_M2}
GLUCOSE SERPL-MCNC: 89 MG/DL (ref 65–99)
HBA1C MFR BLD: 6.1 %
HCT VFR BLD AUTO: 43.3 % (ref 37–48)
HDLC SERPL-MCNC: 48 MG/DL (ref 23–92)
HGB BLD-MCNC: 14.1 G/DL (ref 12.5–17)
LDLC SERPL CALC-MCNC: 141 MG/DL
LYMPHOCYTES # BLD AUTO: 2 THOU/CMM (ref 1–3)
LYMPHOCYTES NFR BLD AUTO: 29 %
MCH RBC QN AUTO: 26.7 PG (ref 27–36)
MCHC RBC AUTO-ENTMCNC: 32.7 G/DL (ref 32–37)
MCV RBC AUTO: 82 FL (ref 80–100)
MONOCYTES # BLD AUTO: 0.8 THOU/CMM (ref 0.3–1)
MONOCYTES NFR BLD AUTO: 11 %
NEUTROPHILS # BLD AUTO: 4 THOU/CMM (ref 1.8–7.8)
NEUTROPHILS NFR BLD AUTO: 57 %
NONHDLC SERPL-MCNC: 158 MG/DL
PLATELET # BLD AUTO: 345 THOU/CMM (ref 140–350)
PMV BLD REES-ECKER: 9 FL (ref 7.5–11.3)
POTASSIUM SERPL-SCNC: 4.3 MMOL/L (ref 3.5–5.2)
PROT SERPL-MCNC: 7.2 G/DL (ref 6.3–8.3)
RBC # BLD AUTO: 5.29 MILL/CMM (ref 4–5.4)
SODIUM SERPL-SCNC: 140 MMOL/L (ref 135–145)
T4 FREE SERPL-MCNC: 0.87 NG/DL (ref 0.61–1.12)
TRIGL SERPL-MCNC: 87 MG/DL
TSH SERPL-ACNC: 2.09 UIU/ML (ref 0.45–5.33)
WBC # BLD AUTO: 7 THOU/CMM (ref 4–10.5)

## 2025-05-27 ENCOUNTER — TELEPHONE (OUTPATIENT)
Dept: GASTROENTEROLOGY | Facility: CLINIC | Age: 45
End: 2025-05-27

## 2025-05-27 NOTE — TELEPHONE ENCOUNTER
Lmom   In review of our records, it shows you are due for the followin Year repeat Office Visit  Please call the office to schedule your appointment [phone].

## 2025-07-01 ENCOUNTER — OFFICE VISIT (OUTPATIENT)
Dept: GASTROENTEROLOGY | Facility: CLINIC | Age: 45
End: 2025-07-01
Payer: COMMERCIAL

## 2025-07-01 VITALS
SYSTOLIC BLOOD PRESSURE: 124 MMHG | DIASTOLIC BLOOD PRESSURE: 70 MMHG | TEMPERATURE: 97.6 F | HEIGHT: 67 IN | WEIGHT: 225 LBS | BODY MASS INDEX: 35.31 KG/M2

## 2025-07-01 DIAGNOSIS — Z86.0100 HISTORY OF COLON POLYPS: ICD-10-CM

## 2025-07-01 DIAGNOSIS — K22.70 BARRETT'S ESOPHAGUS WITHOUT DYSPLASIA: Primary | ICD-10-CM

## 2025-07-01 DIAGNOSIS — K59.04 CHRONIC IDIOPATHIC CONSTIPATION: ICD-10-CM

## 2025-07-01 DIAGNOSIS — K20.90 ESOPHAGITIS: ICD-10-CM

## 2025-07-01 DIAGNOSIS — K21.9 GASTROESOPHAGEAL REFLUX DISEASE WITHOUT ESOPHAGITIS: ICD-10-CM

## 2025-07-01 PROCEDURE — 99213 OFFICE O/P EST LOW 20 MIN: CPT | Performed by: PHYSICIAN ASSISTANT

## 2025-07-01 RX ORDER — OMEPRAZOLE 40 MG/1
40 CAPSULE, DELAYED RELEASE ORAL DAILY
Qty: 90 CAPSULE | Refills: 5 | Status: SHIPPED | OUTPATIENT
Start: 2025-07-01

## 2025-07-01 NOTE — PROGRESS NOTES
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 He is doing well on daily omeprazole 40 mg.   -continue omeprazole 40 mg daily  - Would recommend repeat EGD at the same time as his next colonoscopy in        Gastroesophageal reflux disease without esophagitis  Pt doing well on omeprazole 40 mg daily and would like to stay on this dose.   -continue omeprazole 40 mg daily for now; if he remains stable, we can try him on the 20 mg dose at that time       Chronic idiopathic constipation  Stable. Pt says he has not been needing to take any laxatives for this as of recently.        History of colon polyps  Patient was found to have 4 polyps on colonoscopy last year, 3 of which were precancerous (1 was TA and the other 2 were sessile serrated lesions in the transverse colon.) Patient says he does have family history of colon cancer in his paternal uncle but denies any first-degree relatives.  - Repeat colonoscopy            History of Present Illness   HPI  Maciel Alford is a 44 y.o. male who presents for follow-up.He is doing well on daily omeprazole 40 mg.  He denies any heartburn, nausea or vomiting, abdominal pain, trouble swallowing or eating.  He says his constipation has actually been stable and has not been causing issues for him.  He says he has not been needing to take any laxatives for his constipation recently and is moving his bowels daily on his own.  The patient otherwise denies unintentional  weight loss, fevers, chills, night sweats, bloody or black bowel movements.  Patient says he does have family history of colon cancer in his paternal uncle but denies any first-degree relatives.  History obtained from: patient    Review of Systems   Constitutional:  Negative for appetite change, chills, diaphoresis, fatigue, fever and unexpected weight change.   HENT:  Negative for trouble swallowing.    Gastrointestinal:  Negative for abdominal distention, abdominal pain, anal bleeding, blood in stool, constipation, diarrhea, nausea, rectal pain and vomiting.     Medical History Reviewed by provider this encounter:     .  Past Medical History   Past Medical History[1]  Past Surgical History[2]  Family History[3]   reports that he has never smoked. He has never used smokeless tobacco. He reports current alcohol use. He reports that he does not use drugs.  Current Outpatient Medications   Medication Instructions    ALPRAZolam (XANAX) 0.25 mg tablet Take half to 1 tablet daily as needed for severe stress    cetirizine (ZYRTEC) 10 mg, As needed    fluticasone (FLONASE) 50 mcg/act nasal spray 1 spray, Nasal, Daily    omeprazole (PRILOSEC) 40 mg, Oral, Daily    polyethylene glycol (MIRALAX) 17 g, Oral, Daily    sildenafil (VIAGRA) 50 mg, Oral, Daily PRN, 1 hour prior to sexual activity   Allergies[4]   Medications Ordered Prior to Encounter[5]   Social History[6]     Objective   There were no vitals taken for this visit.     Physical Exam  Constitutional:       Appearance: Normal appearance.   Abdominal:      General: Abdomen is flat. Bowel sounds are normal. There is no distension.      Palpations: Abdomen is soft. There is no mass.      Tenderness: There is no abdominal tenderness. There is no right CVA tenderness, left CVA tenderness, guarding or rebound.     Neurological:      Mental Status: He is alert.         Administrative Statements   I have spent a total time of 25 minutes in caring for this patient on the day  of the visit/encounter including Diagnostic results, Prognosis, Risks and benefits of tx options, Instructions for management, Patient and family education, Importance of tx compliance, Risk factor reductions, Impressions, Counseling / Coordination of care, Documenting in the medical record, Reviewing/placing orders in the medical record (including tests, medications, and/or procedures), Obtaining or reviewing history  , and Communicating with other healthcare professionals .       [1]   Past Medical History:  Diagnosis Date    Anxiety     Depression     Depression with anxiety 2012    Seasonal allergies    [2]   Past Surgical History:  Procedure Laterality Date    COLONOSCOPY      UPPER GASTROINTESTINAL ENDOSCOPY      WISDOM TOOTH EXTRACTION     [3]   Family History  Problem Relation Name Age of Onset    Colon cancer Paternal Uncle             [4]   Allergies  Allergen Reactions    Amoxicillin     Penicillins    [5]   Current Outpatient Medications on File Prior to Visit   Medication Sig Dispense Refill    ALPRAZolam (XANAX) 0.25 mg tablet Take half to 1 tablet daily as needed for severe stress 10 tablet 0    cetirizine (ZyrTEC) 10 mg tablet Take 10 mg by mouth as needed for allergies      fluticasone (FLONASE) 50 mcg/act nasal spray 1 spray into each nostril daily 1 Bottle 0    sildenafil (VIAGRA) 50 MG tablet Take 1 tablet (50 mg total) by mouth daily as needed for erectile dysfunction 1 hour prior to sexual activity 10 tablet 0    [DISCONTINUED] omeprazole (PriLOSEC) 40 MG capsule TAKE 1 CAPSULE(40 MG) BY MOUTH DAILY 90 capsule 1    polyethylene glycol (MIRALAX) 17 g packet Take 17 g by mouth daily 510 g 5     No current facility-administered medications on file prior to visit.   [6]   Social History  Tobacco Use    Smoking status: Never    Smokeless tobacco: Never   Vaping Use    Vaping status: Never Used   Substance and Sexual Activity    Alcohol use: Yes     Comment: occaisionally    Drug use:  Never

## 2025-07-22 DIAGNOSIS — K20.90 ESOPHAGITIS: ICD-10-CM

## 2025-07-23 RX ORDER — OMEPRAZOLE 40 MG/1
40 CAPSULE, DELAYED RELEASE ORAL DAILY
Qty: 90 CAPSULE | Refills: 1 | Status: SHIPPED | OUTPATIENT
Start: 2025-07-23